# Patient Record
Sex: MALE | Race: WHITE | NOT HISPANIC OR LATINO | Employment: OTHER | URBAN - METROPOLITAN AREA
[De-identification: names, ages, dates, MRNs, and addresses within clinical notes are randomized per-mention and may not be internally consistent; named-entity substitution may affect disease eponyms.]

---

## 2018-01-11 ENCOUNTER — OFFICE VISIT (OUTPATIENT)
Dept: MEDICAL GROUP | Facility: MEDICAL CENTER | Age: 43
End: 2018-01-11
Payer: COMMERCIAL

## 2018-01-11 VITALS
RESPIRATION RATE: 20 BRPM | TEMPERATURE: 98 F | OXYGEN SATURATION: 98 % | BODY MASS INDEX: 24.4 KG/M2 | WEIGHT: 184.08 LBS | HEIGHT: 73 IN | HEART RATE: 46 BPM | SYSTOLIC BLOOD PRESSURE: 130 MMHG | DIASTOLIC BLOOD PRESSURE: 70 MMHG

## 2018-01-11 DIAGNOSIS — Z13.1 SCREENING FOR DIABETES MELLITUS: ICD-10-CM

## 2018-01-11 DIAGNOSIS — Z00.00 HEALTHCARE MAINTENANCE: ICD-10-CM

## 2018-01-11 DIAGNOSIS — Z13.6 SCREENING FOR ISCHEMIC HEART DISEASE: ICD-10-CM

## 2018-01-11 PROCEDURE — 99386 PREV VISIT NEW AGE 40-64: CPT | Performed by: FAMILY MEDICINE

## 2018-01-11 ASSESSMENT — PATIENT HEALTH QUESTIONNAIRE - PHQ9: CLINICAL INTERPRETATION OF PHQ2 SCORE: 0

## 2018-01-11 NOTE — PROGRESS NOTES
"[facility]  Progress Note  New Patient    Subjective:   Sergio Castro is a 42 y.o. male here today for a wellness exam. This is a new patient to me. The patient comes in alone.     Healthcare maintenance  Lipids: ordered.  Fasting Glucose: ordered.   PHQ2: done 01/11/18 and normal.    Flu vaccine: patient declines.  Tdap: patient states this was done 6-7 years ago.      No current outpatient prescriptions on file prior to visit.     No current facility-administered medications on file prior to visit.        Past Medical History:   Diagnosis Date   • Heart murmur     Mitral Valve Prolapse       Allergies: Patient has no known allergies.    Surgical History:  has a past surgical history that includes shoulder arthroscopy (Right, 1990); other (1979); shoulder arthroscopy w/ rotator cuff repair (Left, 4/6/2016); shoulder decompression arthroscopic (Left, 4/6/2016); shoulder arthroscopy w/ bicipital tenodesis repair (Left, 4/6/2016); and hernia repair (1981).    Family History: family history includes No Known Problems in his father and mother.    Social History:  reports that he has never smoked. He has never used smokeless tobacco. He reports that he drinks alcohol. He reports that he does not use drugs.    ROS:   No CP, SOB, lightheadedness.       Objective:     Vitals:    01/11/18 1103   BP: 130/70   Pulse: (!) 46   Resp: 20   Temp: 36.7 °C (98 °F)   SpO2: 98%   Weight: 83.5 kg (184 lb 1.4 oz)   Height: 1.854 m (6' 1\")       Physical Exam:  Constitutional: Alert, no distress.  Skin: Warm, dry, good turgor, no rashes in visible areas.  Eye: Equal, conjunctiva clear, lids normal.  Respiratory: Unlabored respiratory effort, lungs clear to auscultation, no wheezes, no ronchi.  Cardiovascular: Normal S1, S2, no murmur, no edema.  Psych: Alert and oriented,  normal affect and mood.        Assessment and Plan:     1. Healthcare maintenance  - see HPI.   - discussed diet and exercise.     2. Screening for " ischemic heart disease  - LIPID PROFILE; Future    3. Screening for diabetes mellitus  - BASIC METABOLIC PANEL; Future        Followup: Return in about 1 year (around 1/11/2019), or if symptoms worsen or fail to improve, for Wellness Visit, Long.

## 2018-01-11 NOTE — ASSESSMENT & PLAN NOTE
Lipids: ordered.  Fasting Glucose: ordered.   PHQ2: done 01/11/18 and normal.    Flu vaccine: patient declines.  Tdap: patient states this was done 6-7 years ago.

## 2018-01-16 ENCOUNTER — HOSPITAL ENCOUNTER (OUTPATIENT)
Dept: LAB | Facility: MEDICAL CENTER | Age: 43
End: 2018-01-16
Attending: FAMILY MEDICINE
Payer: COMMERCIAL

## 2018-01-16 DIAGNOSIS — Z13.6 SCREENING FOR ISCHEMIC HEART DISEASE: ICD-10-CM

## 2018-01-16 DIAGNOSIS — Z13.1 SCREENING FOR DIABETES MELLITUS: ICD-10-CM

## 2018-01-16 LAB
ANION GAP SERPL CALC-SCNC: 6 MMOL/L (ref 0–11.9)
BUN SERPL-MCNC: 19 MG/DL (ref 8–22)
CALCIUM SERPL-MCNC: 9.5 MG/DL (ref 8.5–10.5)
CHLORIDE SERPL-SCNC: 108 MMOL/L (ref 96–112)
CHOLEST SERPL-MCNC: 159 MG/DL (ref 100–199)
CO2 SERPL-SCNC: 25 MMOL/L (ref 20–33)
CREAT SERPL-MCNC: 1.06 MG/DL (ref 0.5–1.4)
GLUCOSE SERPL-MCNC: 87 MG/DL (ref 65–99)
HDLC SERPL-MCNC: 58 MG/DL
LDLC SERPL CALC-MCNC: 87 MG/DL
POTASSIUM SERPL-SCNC: 4.5 MMOL/L (ref 3.6–5.5)
SODIUM SERPL-SCNC: 139 MMOL/L (ref 135–145)
TRIGL SERPL-MCNC: 69 MG/DL (ref 0–149)

## 2018-01-16 PROCEDURE — 36415 COLL VENOUS BLD VENIPUNCTURE: CPT

## 2018-01-16 PROCEDURE — 80048 BASIC METABOLIC PNL TOTAL CA: CPT

## 2018-01-16 PROCEDURE — 80061 LIPID PANEL: CPT

## 2018-01-17 ENCOUNTER — TELEPHONE (OUTPATIENT)
Dept: MEDICAL GROUP | Facility: MEDICAL CENTER | Age: 43
End: 2018-01-17

## 2018-01-18 NOTE — TELEPHONE ENCOUNTER
----- Message from Solitario Michael M.D. sent at 1/17/2018 11:26 AM PST -----  Please call and inform this patient of the following:  His labs are all normal.

## 2018-01-18 NOTE — TELEPHONE ENCOUNTER
Phone Number Called: [phone] (home)     Message: Left msg to return call.    Left Message for patient to call back: yes

## 2019-01-11 ENCOUNTER — OFFICE VISIT (OUTPATIENT)
Dept: MEDICAL GROUP | Facility: MEDICAL CENTER | Age: 44
End: 2019-01-11
Payer: COMMERCIAL

## 2019-01-11 VITALS
DIASTOLIC BLOOD PRESSURE: 74 MMHG | HEART RATE: 48 BPM | RESPIRATION RATE: 16 BRPM | HEIGHT: 73 IN | BODY MASS INDEX: 24.65 KG/M2 | SYSTOLIC BLOOD PRESSURE: 122 MMHG | WEIGHT: 186 LBS | TEMPERATURE: 97.2 F | OXYGEN SATURATION: 100 %

## 2019-01-11 DIAGNOSIS — Z00.00 HEALTHCARE MAINTENANCE: ICD-10-CM

## 2019-01-11 DIAGNOSIS — Z23 NEED FOR VACCINATION: ICD-10-CM

## 2019-01-11 DIAGNOSIS — R01.1 MURMUR: ICD-10-CM

## 2019-01-11 PROCEDURE — 99396 PREV VISIT EST AGE 40-64: CPT | Mod: 25 | Performed by: FAMILY MEDICINE

## 2019-01-11 PROCEDURE — 90471 IMMUNIZATION ADMIN: CPT | Performed by: FAMILY MEDICINE

## 2019-01-11 PROCEDURE — 90715 TDAP VACCINE 7 YRS/> IM: CPT | Performed by: FAMILY MEDICINE

## 2019-01-11 ASSESSMENT — PATIENT HEALTH QUESTIONNAIRE - PHQ9: CLINICAL INTERPRETATION OF PHQ2 SCORE: 0

## 2019-01-11 NOTE — PROGRESS NOTES
"[facility]  Progress Note  Established Patient    Subjective:   Sergio Castro is a 43 y.o. male here today for a wellness esxam. The patient is alone.     Healthcare maintenance  Lipids: ordered.  Fasting Glucose: ordered.     Flu vaccine: patient declines.  Tdap: given 01/11/19.     Murmur  Patient states that he has a history of a mitral valve prolapse.  He denies chest pain, shortness of breath, palpitations, dizziness, lightheadedness and syncope.      No current outpatient prescriptions on file prior to visit.     No current facility-administered medications on file prior to visit.        Past Medical History:   Diagnosis Date   • Heart murmur     Mitral Valve Prolapse       Allergies: Patient has no known allergies.    Surgical History:  has a past surgical history that includes shoulder arthroscopy (Right, 1990); other (1979); shoulder arthroscopy w/ rotator cuff repair (Left, 4/6/2016); shoulder decompression arthroscopic (Left, 4/6/2016); shoulder arthroscopy w/ bicipital tenodesis repair (Left, 4/6/2016); and hernia repair (1981).    Family History: family history includes No Known Problems in his father and mother.    Social History:  reports that he has never smoked. He has never used smokeless tobacco. He reports that he drinks alcohol. He reports that he does not use drugs.    ROS: see HPI.        Objective:     Vitals:    01/11/19 1142   BP: 122/74   BP Location: Left arm   Patient Position: Sitting   BP Cuff Size: Large adult   Pulse: (!) 48   Resp: 16   Temp: 36.2 °C (97.2 °F)   TempSrc: Temporal   SpO2: 100%   Weight: 84.4 kg (186 lb)   Height: 1.854 m (6' 1\")       Physical Exam:  General: alert in no apparent distress.   Cardio: mid-systolic click.   Resp: CTAB no w/r/r.         Assessment and Plan:     1. Healthcare maintenance  - see HPI.   - discussed diet and exercise, Mediterranean Diet handout given.   - Lipid Profile; Future  - BLOOD GLUCOSE; Future    2. Need for " vaccination  - TDAP VACCINE =>8YO IM    3. Murmur  - EC-ECHOCARDIOGRAM COMPLETE W/O CONT; Future        Followup: Return in about 18 months (around 7/11/2020), or if symptoms worsen or fail to improve, for Wellness Visit, Long.

## 2019-01-11 NOTE — ASSESSMENT & PLAN NOTE
Patient states that he has a history of a mitral valve prolapse.  He denies chest pain, shortness of breath, palpitations, dizziness, lightheadedness and syncope.

## 2019-01-11 NOTE — PATIENT INSTRUCTIONS
Results for DEVYN MONROY (MRN [id number]) as of 1/11/2019 11:54   Ref. Range 1/16/2018 07:11   Sodium Latest Ref Range: 135 - 145 mmol/L 139   Potassium Latest Ref Range: 3.6 - 5.5 mmol/L 4.5   Chloride Latest Ref Range: 96 - 112 mmol/L 108   Co2 Latest Ref Range: 20 - 33 mmol/L 25   Anion Gap Latest Ref Range: 0.0 - 11.9  6.0   Glucose Latest Ref Range: 65 - 99 mg/dL 87   Bun Latest Ref Range: 8 - 22 mg/dL 19   Creatinine Latest Ref Range: 0.50 - 1.40 mg/dL 1.06   GFR If  Latest Ref Range: >60 mL/min/1.73 m 2 >60   GFR If Non  Latest Ref Range: >60 mL/min/1.73 m 2 >60   Calcium Latest Ref Range: 8.5 - 10.5 mg/dL 9.5   Cholesterol,Tot Latest Ref Range: 100 - 199 mg/dL 159   Triglycerides Latest Ref Range: 0 - 149 mg/dL 69   HDL Latest Ref Range: >=40 mg/dL 58   LDL Latest Ref Range: <100 mg/dL 87

## 2019-01-11 NOTE — ASSESSMENT & PLAN NOTE
Lipids: ordered.  Fasting Glucose: ordered.     Flu vaccine: patient declines.  Tdap: given 01/11/19.

## 2019-09-12 ENCOUNTER — OFFICE VISIT (OUTPATIENT)
Dept: URGENT CARE | Facility: CLINIC | Age: 44
End: 2019-09-12
Payer: COMMERCIAL

## 2019-09-12 ENCOUNTER — TELEPHONE (OUTPATIENT)
Dept: URGENT CARE | Facility: CLINIC | Age: 44
End: 2019-09-12

## 2019-09-12 ENCOUNTER — APPOINTMENT (OUTPATIENT)
Dept: MEDICAL GROUP | Facility: MEDICAL CENTER | Age: 44
End: 2019-09-12
Payer: COMMERCIAL

## 2019-09-12 ENCOUNTER — HOSPITAL ENCOUNTER (INPATIENT)
Facility: MEDICAL CENTER | Age: 44
LOS: 2 days | DRG: 868 | End: 2019-09-14
Attending: EMERGENCY MEDICINE | Admitting: HOSPITALIST
Payer: COMMERCIAL

## 2019-09-12 ENCOUNTER — HOSPITAL ENCOUNTER (OUTPATIENT)
Dept: LAB | Facility: MEDICAL CENTER | Age: 44
End: 2019-09-12
Attending: NURSE PRACTITIONER
Payer: COMMERCIAL

## 2019-09-12 VITALS
SYSTOLIC BLOOD PRESSURE: 98 MMHG | DIASTOLIC BLOOD PRESSURE: 68 MMHG | HEART RATE: 50 BPM | WEIGHT: 185 LBS | HEIGHT: 74 IN | OXYGEN SATURATION: 99 % | BODY MASS INDEX: 23.74 KG/M2 | TEMPERATURE: 98.1 F | RESPIRATION RATE: 16 BRPM

## 2019-09-12 DIAGNOSIS — D69.6 THROMBOCYTOPENIA (HCC): ICD-10-CM

## 2019-09-12 DIAGNOSIS — R21 RASH: ICD-10-CM

## 2019-09-12 DIAGNOSIS — R01.1 MURMUR: ICD-10-CM

## 2019-09-12 DIAGNOSIS — D61.818 PANCYTOPENIA, ACQUIRED (HCC): ICD-10-CM

## 2019-09-12 LAB
ABO + RH BLD: NORMAL
ABO GROUP BLD: NORMAL
ALBUMIN SERPL BCP-MCNC: 3.7 G/DL (ref 3.2–4.9)
ALBUMIN SERPL BCP-MCNC: 3.8 G/DL (ref 3.2–4.9)
ALBUMIN/GLOB SERPL: 1.1 G/DL
ALBUMIN/GLOB SERPL: 1.1 G/DL
ALP SERPL-CCNC: 41 U/L (ref 30–99)
ALP SERPL-CCNC: 49 U/L (ref 30–99)
ALT SERPL-CCNC: 74 U/L (ref 2–50)
ALT SERPL-CCNC: 85 U/L (ref 2–50)
ANION GAP SERPL CALC-SCNC: 13 MMOL/L (ref 0–11.9)
ANION GAP SERPL CALC-SCNC: 9 MMOL/L (ref 0–11.9)
APTT PPP: 26.6 SEC (ref 24.7–36)
AST SERPL-CCNC: 113 U/L (ref 12–45)
AST SERPL-CCNC: 145 U/L (ref 12–45)
BASOPHILS # BLD AUTO: 0 % (ref 0–1.8)
BASOPHILS # BLD AUTO: 1 % (ref 0–1.8)
BASOPHILS # BLD: 0 K/UL (ref 0–0.12)
BASOPHILS # BLD: 0.03 K/UL (ref 0–0.12)
BILIRUB SERPL-MCNC: 1.1 MG/DL (ref 0.1–1.5)
BILIRUB SERPL-MCNC: 1.4 MG/DL (ref 0.1–1.5)
BLD GP AB SCN SERPL QL: NORMAL
BUN SERPL-MCNC: 11 MG/DL (ref 8–22)
BUN SERPL-MCNC: 17 MG/DL (ref 8–22)
CALCIUM SERPL-MCNC: 8.8 MG/DL (ref 8.4–10.2)
CALCIUM SERPL-MCNC: 8.8 MG/DL (ref 8.5–10.5)
CHLORIDE SERPL-SCNC: 102 MMOL/L (ref 96–112)
CHLORIDE SERPL-SCNC: 108 MMOL/L (ref 96–112)
CO2 SERPL-SCNC: 20 MMOL/L (ref 20–33)
CO2 SERPL-SCNC: 23 MMOL/L (ref 20–33)
CREAT SERPL-MCNC: 0.87 MG/DL (ref 0.5–1.4)
CREAT SERPL-MCNC: 1 MG/DL (ref 0.5–1.4)
CRP SERPL HS-MCNC: 1.18 MG/DL (ref 0–0.75)
EOSINOPHIL # BLD AUTO: 0 K/UL (ref 0–0.51)
EOSINOPHIL # BLD AUTO: 0 K/UL (ref 0–0.51)
EOSINOPHIL NFR BLD: 0 % (ref 0–6.9)
EOSINOPHIL NFR BLD: 0 % (ref 0–6.9)
ERYTHROCYTE [DISTWIDTH] IN BLOOD BY AUTOMATED COUNT: 39.9 FL (ref 35.9–50)
ERYTHROCYTE [DISTWIDTH] IN BLOOD BY AUTOMATED COUNT: 40.5 FL (ref 35.9–50)
ERYTHROCYTE [SEDIMENTATION RATE] IN BLOOD BY WESTERGREN METHOD: 18 MM/HOUR (ref 0–15)
FIBRINOGEN PPP-MCNC: 387 MG/DL (ref 215–460)
GLOBULIN SER CALC-MCNC: 3.4 G/DL (ref 1.9–3.5)
GLOBULIN SER CALC-MCNC: 3.6 G/DL (ref 1.9–3.5)
GLUCOSE SERPL-MCNC: 140 MG/DL (ref 65–99)
GLUCOSE SERPL-MCNC: 98 MG/DL (ref 65–99)
HAV IGM SERPL QL IA: NEGATIVE
HBV CORE IGM SER QL: NEGATIVE
HBV SURFACE AG SER QL: NEGATIVE
HCT VFR BLD AUTO: 39.2 % (ref 42–52)
HCT VFR BLD AUTO: 43 % (ref 42–52)
HCV AB SER QL: NEGATIVE
HGB BLD-MCNC: 13.6 G/DL (ref 14–18)
HGB BLD-MCNC: 14.7 G/DL (ref 14–18)
HIV 1+2 AB+HIV1 P24 AG SERPL QL IA: NON REACTIVE
INR PPP: 0.96 (ref 0.87–1.13)
LACTATE BLD-SCNC: 1.2 MMOL/L (ref 0.5–2)
LDH SERPL L TO P-CCNC: 439 U/L (ref 107–266)
LYMPHOCYTES # BLD AUTO: 0.93 K/UL (ref 1–4.8)
LYMPHOCYTES # BLD AUTO: 2.53 K/UL (ref 1–4.8)
LYMPHOCYTES NFR BLD: 30 % (ref 22–41)
LYMPHOCYTES NFR BLD: 55 % (ref 22–41)
MANUAL DIFF BLD: NORMAL
MANUAL DIFF BLD: NORMAL
MCH RBC QN AUTO: 29.4 PG (ref 27–33)
MCH RBC QN AUTO: 30.3 PG (ref 27–33)
MCHC RBC AUTO-ENTMCNC: 34.2 G/DL (ref 33.7–35.3)
MCHC RBC AUTO-ENTMCNC: 34.7 G/DL (ref 33.7–35.3)
MCV RBC AUTO: 86 FL (ref 81.4–97.8)
MCV RBC AUTO: 87.3 FL (ref 81.4–97.8)
METAMYELOCYTES NFR BLD MANUAL: 1 %
MONOCYTES # BLD AUTO: 0.18 K/UL (ref 0–0.85)
MONOCYTES # BLD AUTO: 0.19 K/UL (ref 0–0.85)
MONOCYTES NFR BLD AUTO: 4 % (ref 0–13.4)
MONOCYTES NFR BLD AUTO: 6 % (ref 0–13.4)
MORPHOLOGY BLD-IMP: NORMAL
MYELOCYTES NFR BLD MANUAL: 2 %
NEUTROPHILS # BLD AUTO: 1.86 K/UL (ref 1.82–7.42)
NEUTROPHILS # BLD AUTO: 1.89 K/UL (ref 1.82–7.42)
NEUTROPHILS NFR BLD: 38 % (ref 44–72)
NEUTROPHILS NFR BLD: 55 % (ref 44–72)
NEUTS BAND NFR BLD MANUAL: 3 % (ref 0–10)
NEUTS BAND NFR BLD MANUAL: 5 % (ref 0–10)
NRBC # BLD AUTO: 0 K/UL
NRBC # BLD AUTO: 0 K/UL
NRBC BLD-RTO: 0 /100 WBC
NRBC BLD-RTO: 0 /100 WBC
PLATELET # BLD AUTO: 43 K/UL (ref 164–446)
PLATELET # BLD AUTO: 48 K/UL (ref 164–446)
PLATELET BLD QL SMEAR: NORMAL
PLATELET BLD QL SMEAR: NORMAL
PLATELETS.RETICULATED NFR BLD AUTO: 12 K/UL (ref 0.6–13.1)
PLATELETS.RETICULATED NFR BLD AUTO: 9.1 K/UL (ref 0.6–13.1)
PMV BLD AUTO: 10.9 FL (ref 9–12.9)
PMV BLD AUTO: 12.5 FL (ref 9–12.9)
POTASSIUM SERPL-SCNC: 3.8 MMOL/L (ref 3.6–5.5)
POTASSIUM SERPL-SCNC: 3.9 MMOL/L (ref 3.6–5.5)
PROCALCITONIN SERPL-MCNC: 0.17 NG/ML
PROT SERPL-MCNC: 7.1 G/DL (ref 6–8.2)
PROT SERPL-MCNC: 7.4 G/DL (ref 6–8.2)
PROTHROMBIN TIME: 13 SEC (ref 12–14.6)
RBC # BLD AUTO: 4.49 M/UL (ref 4.7–6.1)
RBC # BLD AUTO: 5 M/UL (ref 4.7–6.1)
RBC BLD AUTO: NORMAL
RBC BLD AUTO: NORMAL
RH BLD: NORMAL
SMUDGE CELLS BLD QL SMEAR: NORMAL
SODIUM SERPL-SCNC: 137 MMOL/L (ref 135–145)
SODIUM SERPL-SCNC: 138 MMOL/L (ref 135–145)
VARIANT LYMPHS BLD QL SMEAR: NORMAL
WBC # BLD AUTO: 3.1 K/UL (ref 4.8–10.8)
WBC # BLD AUTO: 4.6 K/UL (ref 4.8–10.8)

## 2019-09-12 PROCEDURE — 85007 BL SMEAR W/DIFF WBC COUNT: CPT

## 2019-09-12 PROCEDURE — 85027 COMPLETE CBC AUTOMATED: CPT

## 2019-09-12 PROCEDURE — 83615 LACTATE (LD) (LDH) ENZYME: CPT

## 2019-09-12 PROCEDURE — 86901 BLOOD TYPING SEROLOGIC RH(D): CPT

## 2019-09-12 PROCEDURE — 86038 ANTINUCLEAR ANTIBODIES: CPT

## 2019-09-12 PROCEDURE — 86850 RBC ANTIBODY SCREEN: CPT

## 2019-09-12 PROCEDURE — 85730 THROMBOPLASTIN TIME PARTIAL: CPT

## 2019-09-12 PROCEDURE — 85027 COMPLETE CBC AUTOMATED: CPT | Mod: 91

## 2019-09-12 PROCEDURE — 87040 BLOOD CULTURE FOR BACTERIA: CPT

## 2019-09-12 PROCEDURE — 85610 PROTHROMBIN TIME: CPT

## 2019-09-12 PROCEDURE — 85055 RETICULATED PLATELET ASSAY: CPT | Mod: 91

## 2019-09-12 PROCEDURE — 770006 HCHG ROOM/CARE - MED/SURG/GYN SEMI*

## 2019-09-12 PROCEDURE — 99285 EMERGENCY DEPT VISIT HI MDM: CPT

## 2019-09-12 PROCEDURE — 36415 COLL VENOUS BLD VENIPUNCTURE: CPT

## 2019-09-12 PROCEDURE — 80074 ACUTE HEPATITIS PANEL: CPT

## 2019-09-12 PROCEDURE — 80053 COMPREHEN METABOLIC PANEL: CPT

## 2019-09-12 PROCEDURE — 86140 C-REACTIVE PROTEIN: CPT

## 2019-09-12 PROCEDURE — 85055 RETICULATED PLATELET ASSAY: CPT

## 2019-09-12 PROCEDURE — 87389 HIV-1 AG W/HIV-1&-2 AB AG IA: CPT

## 2019-09-12 PROCEDURE — 99215 OFFICE O/P EST HI 40 MIN: CPT | Performed by: NURSE PRACTITIONER

## 2019-09-12 PROCEDURE — 85652 RBC SED RATE AUTOMATED: CPT

## 2019-09-12 PROCEDURE — 84145 PROCALCITONIN (PCT): CPT

## 2019-09-12 PROCEDURE — 85384 FIBRINOGEN ACTIVITY: CPT

## 2019-09-12 PROCEDURE — 86900 BLOOD TYPING SEROLOGIC ABO: CPT

## 2019-09-12 PROCEDURE — 85007 BL SMEAR W/DIFF WBC COUNT: CPT | Mod: 91

## 2019-09-12 PROCEDURE — 83605 ASSAY OF LACTIC ACID: CPT

## 2019-09-12 PROCEDURE — 80053 COMPREHEN METABOLIC PANEL: CPT | Mod: 91

## 2019-09-12 PROCEDURE — 99223 1ST HOSP IP/OBS HIGH 75: CPT | Mod: GC | Performed by: HOSPITALIST

## 2019-09-12 RX ORDER — PREDNISONE 20 MG/1
20-40 TABLET ORAL SEE ADMIN INSTRUCTIONS
Status: ON HOLD | COMMUNITY
Start: 2019-09-12 | End: 2019-09-13

## 2019-09-12 RX ORDER — POLYETHYLENE GLYCOL 3350 17 G/17G
1 POWDER, FOR SOLUTION ORAL
Status: DISCONTINUED | OUTPATIENT
Start: 2019-09-12 | End: 2019-09-14 | Stop reason: HOSPADM

## 2019-09-12 RX ORDER — DOXYCYCLINE HYCLATE 100 MG
100 TABLET ORAL 2 TIMES DAILY
Status: ON HOLD | COMMUNITY
Start: 2019-09-12 | End: 2019-09-14

## 2019-09-12 RX ORDER — AMOXICILLIN 250 MG
2 CAPSULE ORAL 2 TIMES DAILY
Status: DISCONTINUED | OUTPATIENT
Start: 2019-09-12 | End: 2019-09-14 | Stop reason: HOSPADM

## 2019-09-12 RX ORDER — DIPHENHYDRAMINE HCL 25 MG
25 TABLET ORAL EVERY 6 HOURS PRN
COMMUNITY
End: 2019-09-12

## 2019-09-12 RX ORDER — BISACODYL 10 MG
10 SUPPOSITORY, RECTAL RECTAL
Status: DISCONTINUED | OUTPATIENT
Start: 2019-09-12 | End: 2019-09-14 | Stop reason: HOSPADM

## 2019-09-12 RX ORDER — ACETAMINOPHEN 325 MG/1
650 TABLET ORAL EVERY 6 HOURS PRN
Status: DISCONTINUED | OUTPATIENT
Start: 2019-09-12 | End: 2019-09-14 | Stop reason: HOSPADM

## 2019-09-12 RX ORDER — DOXYCYCLINE HYCLATE 100 MG
100 TABLET ORAL 2 TIMES DAILY
Qty: 20 TAB | Refills: 0 | Status: SHIPPED | OUTPATIENT
Start: 2019-09-12 | End: 2019-09-12

## 2019-09-12 RX ORDER — DIPHENHYDRAMINE HCL 25 MG
25-50 TABLET ORAL
Status: ON HOLD | COMMUNITY
End: 2019-09-14

## 2019-09-12 RX ORDER — PREDNISONE 20 MG/1
TABLET ORAL
Qty: 14 TAB | Refills: 0 | Status: SHIPPED | OUTPATIENT
Start: 2019-09-12 | End: 2019-09-12

## 2019-09-12 SDOH — HEALTH STABILITY: MENTAL HEALTH: HOW OFTEN DO YOU HAVE A DRINK CONTAINING ALCOHOL?: 2-4 TIMES A MONTH

## 2019-09-12 ASSESSMENT — ENCOUNTER SYMPTOMS
ABDOMINAL PAIN: 0
MUSCULOSKELETAL NEGATIVE: 1
DIARRHEA: 0
MYALGIAS: 0
NAUSEA: 0
DOUBLE VISION: 0
COUGH: 0
HEADACHES: 0
CHILLS: 0
HEADACHES: 0
ABDOMINAL PAIN: 0
BLURRED VISION: 0
TINGLING: 0
CLAUDICATION: 0
VOMITING: 0
SENSORY CHANGE: 0
STRIDOR: 0
HEMOPTYSIS: 0
BLURRED VISION: 0
CONSTIPATION: 0
DIZZINESS: 0
DEPRESSION: 0
NECK PAIN: 0
SPEECH CHANGE: 0
BACK PAIN: 0
HEARTBURN: 0
DIARRHEA: 0
FEVER: 0
ROS GI COMMENTS: DIARRHEA RESOLVED
WHEEZING: 0
DOUBLE VISION: 0
ORTHOPNEA: 0
PALPITATIONS: 0
CHILLS: 0
WEIGHT LOSS: 0
COUGH: 0
TREMORS: 0
VOMITING: 0
NAUSEA: 0
HALLUCINATIONS: 0
PHOTOPHOBIA: 0
PALPITATIONS: 0
FEVER: 0
SORE THROAT: 0
SPUTUM PRODUCTION: 0
DIZZINESS: 0

## 2019-09-12 ASSESSMENT — LIFESTYLE VARIABLES
SUBSTANCE_ABUSE: 0
DO YOU DRINK ALCOHOL: NO

## 2019-09-12 NOTE — PROGRESS NOTES
"Subjective:   Sergio Castro is a 44 y.o. male who presents for Rash (lower extremities, pt notes that he recently returned from hunting, rash started on feet)        HPI   Patient with new onset rash to the bilateral lower extremities (feet) that started several days ago and has been spreading to his legs and arms. Denies itching or pain to the area. States he recently went hunting in [place] and the rash has worsened since his return. Denies alleviating or aggravating factors.  Denies hx of arthritis or bleeding disorders.  Denies recent insect or tick bite.    States earlier this week he felt like he had the flu, but symptoms have resolved.  Denies recent changes to medications - does not currently take any medications or has started new medications. States he took Benadryl, thinking it would help the rash, but without relief. Denies current fever, cough or cold symptoms.    Review of Systems   Constitutional: Negative for chills and fever.   HENT: Negative for congestion, ear discharge, ear pain and sore throat.    Eyes: Negative for blurred vision and double vision.   Respiratory: Negative for cough, wheezing and stridor.    Cardiovascular: Negative for chest pain and palpitations.   Gastrointestinal: Negative for abdominal pain, constipation, diarrhea, nausea and vomiting.   Musculoskeletal: Negative.    Skin: Positive for rash. Negative for itching.   Neurological: Negative for dizziness and headaches.   All other systems reviewed and are negative.    Patient's PMH, SocHx, SurgHx, FamHx, Drug allergies and medications reviewed.     Objective:   BP (!) 98/68 (BP Location: Right arm, Patient Position: Sitting, BP Cuff Size: Adult)   Pulse (!) 50   Temp 36.7 °C (98.1 °F) (Temporal)   Resp 16   Ht 1.88 m (6' 2\")   Wt 83.9 kg (185 lb)   SpO2 99%   BMI 23.75 kg/m²   Physical Exam   Constitutional: He is oriented to person, place, and time. He appears well-developed and well-nourished. No distress.   HENT: "   Head: Normocephalic.   Right Ear: Hearing, tympanic membrane and ear canal normal. Tympanic membrane is not erythematous. No middle ear effusion.   Left Ear: Hearing, tympanic membrane and ear canal normal. Tympanic membrane is not erythematous.  No middle ear effusion.   Nose: Nose normal. No rhinorrhea. Right sinus exhibits no maxillary sinus tenderness and no frontal sinus tenderness. Left sinus exhibits no maxillary sinus tenderness and no frontal sinus tenderness.   Mouth/Throat: Uvula is midline and mucous membranes are normal. No oral lesions. No uvula swelling. Posterior oropharyngeal erythema present. No oropharyngeal exudate, posterior oropharyngeal edema or tonsillar abscesses. Tonsils are 0 on the right. Tonsils are 0 on the left. No tonsillar exudate.   Eyes: Pupils are equal, round, and reactive to light. Conjunctivae, EOM and lids are normal.   Neck: Normal range of motion. No thyromegaly present.   Cardiovascular: Normal rate, regular rhythm and normal heart sounds.   Pulses:       Dorsalis pedis pulses are 2+ on the right side, and 2+ on the left side.        Posterior tibial pulses are 2+ on the right side, and 2+ on the left side.   Pulmonary/Chest: Effort normal and breath sounds normal. No respiratory distress. He has no wheezes.   Lymphadenopathy:        Head (right side): No submandibular and no tonsillar adenopathy present.        Head (left side): No submandibular and no tonsillar adenopathy present.   Neurological: He is alert and oriented to person, place, and time.   Skin: Skin is warm and dry. Purpura and rash noted. Rash is not pustular and not vesicular. He is not diaphoretic. There is erythema.        Erythematous macular rash to the bilateral lower extremities and upper arms/back. No vesicles or pustules noted.   Worsened on bilateral feet with mild purplish tint (appears to be right at the sock line with worsening)     Psychiatric: He has a normal mood and affect. His speech is  normal and behavior is normal. Judgment and thought content normal.   Vitals reviewed.        Assessment/Plan:   Assessment    1. Rash  - predniSONE (DELTASONE) 20 MG Tab; Take 2 tablets twice daily for 2 days, 2 tablets once daily for 2 days and then 1 tablet daily for 2 days.  Dispense: 14 Tab; Refill: 0  - doxycycline (VIBRAMYCIN) 100 MG Tab; Take 1 Tab by mouth 2 times a day for 10 days.  Dispense: 20 Tab; Refill: 0  - CBC WITH DIFFERENTIAL; Future  - Comp Metabolic Panel; Future  - ARON  - WESTERGREN SED RATE; Future    2. Thrombocytopenia (HCC)  - REFERRAL TO HEMATOLOGY ONCOLOGY Referral to? [facility]    Will treat for vasculitis, purpura versus tick bite ([place] Spotted Fever) since with recent hunting in [place]. Ordered blood work to check for signs of infection, inflammatory markers, etc.    ESR slightly elevated and platelet count low. Concerns for TTP and discussed with patient's PCP Dr Michael and he agrees to send patient to ER for further evaluation.  Patient informed to go to ER and I called ER Main and spoke to ER charge, who is aware patient is arriving.    Differential diagnosis, natural history, supportive care, and indications for immediate follow-up discussed.     **Please note that all invasive procedures during this visit were performed by myself and/or the Medical Assistant under the supervision of the PA or MD in office**

## 2019-09-12 NOTE — ED TRIAGE NOTES
"Chief Complaint   Patient presents with   • Sent from Urgent Care   • Rash     diffuse red rash x2 days (worse to BLE). went hunting in [place] and came back with this rash.    • Abnormal Labs     Platelet Count low, elevated LFTs.      Pt to triage for above. Mask provided. Pt in [place] until rm available.   NAD noted, denies PMH.    /77   Pulse 60   Temp 36.2 °C (97.2 °F) (Temporal)   Resp 16   Ht 1.88 m (6' 2\")   Wt 82.5 kg (181 lb 14.1 oz)   SpO2 99%   BMI 23.35 kg/m²     "

## 2019-09-12 NOTE — TELEPHONE ENCOUNTER
Lab called to notify of PLTS=43.  Component      Latest Ref Rng & Units 9/12/2019          12:02 PM   WBC      4.8 - 10.8 K/uL 4.6 (L)   RBC      4.70 - 6.10 M/uL 5.00   Hemoglobin      14.0 - 18.0 g/dL 14.7   Hematocrit      42.0 - 52.0 % 43.0   MCV      81.4 - 97.8 fL 86.0   MCH      27.0 - 33.0 pg 29.4   MCHC      33.7 - 35.3 g/dL 34.2   RDW      35.9 - 50.0 fL 39.9   Platelet Count      164 - 446 K/uL 43 (LL)   MPV      9.0 - 12.9 fL 10.9   Neutrophils-Polys      44.00 - 72.00 % 38.00 (L)   Lymphocytes      22.00 - 41.00 % 55.00 (H)   Monocytes      0.00 - 13.40 % 4.00   Eosinophils      0.00 - 6.90 % 0.00   Basophils      0.00 - 1.80 % 0.00   Nucleated RBC      /100 WBC 0.00   Neutrophils (Absolute)      1.82 - 7.42 K/uL 1.89   Lymphs (Absolute)      1.00 - 4.80 K/uL 2.53   Monos (Absolute)      0.00 - 0.85 K/uL 0.18   Eos (Absolute)      0.00 - 0.51 K/uL 0.00   Baso (Absolute)      0.00 - 0.12 K/uL 0.00   NRBC (Absolute)      K/uL 0.00     Please advise.

## 2019-09-13 ENCOUNTER — APPOINTMENT (OUTPATIENT)
Dept: RADIOLOGY | Facility: MEDICAL CENTER | Age: 44
DRG: 868 | End: 2019-09-13
Attending: STUDENT IN AN ORGANIZED HEALTH CARE EDUCATION/TRAINING PROGRAM
Payer: COMMERCIAL

## 2019-09-13 PROBLEM — D69.3: Status: ACTIVE | Noted: 2019-09-13

## 2019-09-13 PROBLEM — D61.818 PANCYTOPENIA, ACQUIRED (HCC): Status: ACTIVE | Noted: 2019-09-13

## 2019-09-13 PROBLEM — R23.3 PETECHIAE: Status: ACTIVE | Noted: 2019-09-13

## 2019-09-13 PROBLEM — D69.6 THROMBOCYTOPENIA (HCC): Status: ACTIVE | Noted: 2019-09-13

## 2019-09-13 LAB
ALBUMIN SERPL BCP-MCNC: 3.5 G/DL (ref 3.2–4.9)
ALBUMIN/GLOB SERPL: 1 G/DL
ALP SERPL-CCNC: 37 U/L (ref 30–99)
ALT SERPL-CCNC: 65 U/L (ref 2–50)
AMORPH CRY #/AREA URNS HPF: PRESENT /HPF
ANION GAP SERPL CALC-SCNC: 8 MMOL/L (ref 0–11.9)
APPEARANCE UR: ABNORMAL
AST SERPL-CCNC: 90 U/L (ref 12–45)
BACTERIA #/AREA URNS HPF: NEGATIVE /HPF
BASOPHILS # BLD AUTO: 0.9 % (ref 0–1.8)
BASOPHILS # BLD: 0.04 K/UL (ref 0–0.12)
BILIRUB SERPL-MCNC: 1.1 MG/DL (ref 0.1–1.5)
BILIRUB UR QL STRIP.AUTO: NEGATIVE
BUN SERPL-MCNC: 15 MG/DL (ref 8–22)
CALCIUM SERPL-MCNC: 8.9 MG/DL (ref 8.5–10.5)
CHLORIDE SERPL-SCNC: 108 MMOL/L (ref 96–112)
CO2 SERPL-SCNC: 22 MMOL/L (ref 20–33)
COLOR UR: YELLOW
CREAT SERPL-MCNC: 0.86 MG/DL (ref 0.5–1.4)
EKG IMPRESSION: NORMAL
EOSINOPHIL # BLD AUTO: 0 K/UL (ref 0–0.51)
EOSINOPHIL NFR BLD: 0 % (ref 0–6.9)
EPI CELLS #/AREA URNS HPF: ABNORMAL /HPF
ERYTHROCYTE [DISTWIDTH] IN BLOOD BY AUTOMATED COUNT: 40.7 FL (ref 35.9–50)
GLOBULIN SER CALC-MCNC: 3.4 G/DL (ref 1.9–3.5)
GLUCOSE SERPL-MCNC: 117 MG/DL (ref 65–99)
GLUCOSE UR STRIP.AUTO-MCNC: NEGATIVE MG/DL
H PYLORI AG STL QL IA: NOT DETECTED
HCT VFR BLD AUTO: 38.7 % (ref 42–52)
HGB BLD-MCNC: 13.3 G/DL (ref 14–18)
KETONES UR STRIP.AUTO-MCNC: NEGATIVE MG/DL
LEUKOCYTE ESTERASE UR QL STRIP.AUTO: NEGATIVE
LYMPHOCYTES # BLD AUTO: 1.52 K/UL (ref 1–4.8)
LYMPHOCYTES NFR BLD: 38.9 % (ref 22–41)
MANUAL DIFF BLD: NORMAL
MCH RBC QN AUTO: 29.9 PG (ref 27–33)
MCHC RBC AUTO-ENTMCNC: 34.4 G/DL (ref 33.7–35.3)
MCV RBC AUTO: 87 FL (ref 81.4–97.8)
METAMYELOCYTES NFR BLD MANUAL: 2.7 %
MICRO URNS: ABNORMAL
MONOCYTES # BLD AUTO: 0.17 K/UL (ref 0–0.85)
MONOCYTES NFR BLD AUTO: 4.4 % (ref 0–13.4)
MORPHOLOGY BLD-IMP: NORMAL
MYELOCYTES NFR BLD MANUAL: 1.8 %
NEUTROPHILS # BLD AUTO: 2 K/UL (ref 1.82–7.42)
NEUTROPHILS NFR BLD: 48.7 % (ref 44–72)
NEUTS BAND NFR BLD MANUAL: 2.6 % (ref 0–10)
NITRITE UR QL STRIP.AUTO: NEGATIVE
NRBC # BLD AUTO: 0 K/UL
NRBC BLD-RTO: 0 /100 WBC
PH UR STRIP.AUTO: 7 [PH] (ref 5–8)
PLATELET # BLD AUTO: 51 K/UL (ref 164–446)
PLATELET BLD QL SMEAR: NORMAL
PMV BLD AUTO: 11.8 FL (ref 9–12.9)
POIKILOCYTOSIS BLD QL SMEAR: NORMAL
POTASSIUM SERPL-SCNC: 4.1 MMOL/L (ref 3.6–5.5)
PROT SERPL-MCNC: 6.9 G/DL (ref 6–8.2)
PROT UR QL STRIP: NEGATIVE MG/DL
RBC # BLD AUTO: 4.45 M/UL (ref 4.7–6.1)
RBC # URNS HPF: ABNORMAL /HPF
RBC BLD AUTO: PRESENT
RBC UR QL AUTO: NEGATIVE
SODIUM SERPL-SCNC: 138 MMOL/L (ref 135–145)
SP GR UR STRIP.AUTO: 1.02
UROBILINOGEN UR STRIP.AUTO-MCNC: 2 MG/DL
WBC # BLD AUTO: 3.9 K/UL (ref 4.8–10.8)
WBC #/AREA URNS HPF: ABNORMAL /HPF
WBC STL QL MICRO: NORMAL

## 2019-09-13 PROCEDURE — 86638 Q FEVER ANTIBODY: CPT | Mod: 91

## 2019-09-13 PROCEDURE — 700102 HCHG RX REV CODE 250 W/ 637 OVERRIDE(OP): Performed by: STUDENT IN AN ORGANIZED HEALTH CARE EDUCATION/TRAINING PROGRAM

## 2019-09-13 PROCEDURE — 80053 COMPREHEN METABOLIC PANEL: CPT

## 2019-09-13 PROCEDURE — 93010 ELECTROCARDIOGRAM REPORT: CPT | Performed by: INTERNAL MEDICINE

## 2019-09-13 PROCEDURE — 76700 US EXAM ABDOM COMPLETE: CPT

## 2019-09-13 PROCEDURE — 36415 COLL VENOUS BLD VENIPUNCTURE: CPT

## 2019-09-13 PROCEDURE — 85007 BL SMEAR W/DIFF WBC COUNT: CPT

## 2019-09-13 PROCEDURE — 99233 SBSQ HOSP IP/OBS HIGH 50: CPT | Mod: GC | Performed by: HOSPITALIST

## 2019-09-13 PROCEDURE — 99358 PROLONG SERVICE W/O CONTACT: CPT | Mod: GC | Performed by: HOSPITALIST

## 2019-09-13 PROCEDURE — 85027 COMPLETE CBC AUTOMATED: CPT

## 2019-09-13 PROCEDURE — A9270 NON-COVERED ITEM OR SERVICE: HCPCS | Performed by: STUDENT IN AN ORGANIZED HEALTH CARE EDUCATION/TRAINING PROGRAM

## 2019-09-13 PROCEDURE — 93005 ELECTROCARDIOGRAM TRACING: CPT | Performed by: STUDENT IN AN ORGANIZED HEALTH CARE EDUCATION/TRAINING PROGRAM

## 2019-09-13 PROCEDURE — 87338 HPYLORI STOOL AG IA: CPT

## 2019-09-13 PROCEDURE — 87476 LYME DIS DNA AMP PROBE: CPT

## 2019-09-13 PROCEDURE — 770006 HCHG ROOM/CARE - MED/SURG/GYN SEMI*

## 2019-09-13 PROCEDURE — 86790 VIRUS ANTIBODY NOS: CPT

## 2019-09-13 PROCEDURE — 86757 RICKETTSIA ANTIBODY: CPT | Mod: 91

## 2019-09-13 PROCEDURE — 81001 URINALYSIS AUTO W/SCOPE: CPT

## 2019-09-13 PROCEDURE — 71046 X-RAY EXAM CHEST 2 VIEWS: CPT

## 2019-09-13 PROCEDURE — 89055 LEUKOCYTE ASSESSMENT FECAL: CPT

## 2019-09-13 RX ORDER — DOXYCYCLINE 100 MG/1
200 TABLET ORAL ONCE
Status: COMPLETED | OUTPATIENT
Start: 2019-09-13 | End: 2019-09-13

## 2019-09-13 RX ORDER — DOXYCYCLINE 100 MG/1
100 TABLET ORAL EVERY 12 HOURS
Status: DISCONTINUED | OUTPATIENT
Start: 2019-09-13 | End: 2019-09-14 | Stop reason: HOSPADM

## 2019-09-13 RX ADMIN — DOXYCYCLINE 100 MG: 100 TABLET, FILM COATED ORAL at 18:20

## 2019-09-13 RX ADMIN — DOXYCYCLINE 200 MG: 100 TABLET, FILM COATED ORAL at 12:55

## 2019-09-13 ASSESSMENT — PATIENT HEALTH QUESTIONNAIRE - PHQ9
2. FEELING DOWN, DEPRESSED, IRRITABLE, OR HOPELESS: NOT AT ALL
1. LITTLE INTEREST OR PLEASURE IN DOING THINGS: NOT AT ALL
SUM OF ALL RESPONSES TO PHQ9 QUESTIONS 1 AND 2: 0

## 2019-09-13 ASSESSMENT — COGNITIVE AND FUNCTIONAL STATUS - GENERAL
SUGGESTED CMS G CODE MODIFIER MOBILITY: CH
SUGGESTED CMS G CODE MODIFIER DAILY ACTIVITY: CH
MOBILITY SCORE: 24
DAILY ACTIVITIY SCORE: 24

## 2019-09-13 ASSESSMENT — LIFESTYLE VARIABLES
HAVE YOU EVER FELT YOU SHOULD CUT DOWN ON YOUR DRINKING: NO
EVER_SMOKED: NEVER
TOTAL SCORE: 0
HOW MANY TIMES IN THE PAST YEAR HAVE YOU HAD 5 OR MORE DRINKS IN A DAY: 0
ON A TYPICAL DAY WHEN YOU DRINK ALCOHOL HOW MANY DRINKS DO YOU HAVE: 0
CONSUMPTION TOTAL: NEGATIVE
EVER FELT BAD OR GUILTY ABOUT YOUR DRINKING: NO
ALCOHOL_USE: YES
AVERAGE NUMBER OF DAYS PER WEEK YOU HAVE A DRINK CONTAINING ALCOHOL: 1
TOTAL SCORE: 0
TOTAL SCORE: 0
EVER HAD A DRINK FIRST THING IN THE MORNING TO STEADY YOUR NERVES TO GET RID OF A HANGOVER: NO
HAVE PEOPLE ANNOYED YOU BY CRITICIZING YOUR DRINKING: NO

## 2019-09-13 NOTE — PROGRESS NOTES
Pt admitted from ED, report received from WAQAR Waldron. Pt on unit at approximately 0100. Pt AOx4, denies pain at this time, NPO for US abdomen in AM. Pt resting quietly in bed needs met. Call light within reach, personal belongings available, bed in lowest position, treaded socks on, and hourly rounding in place.

## 2019-09-13 NOTE — SENIOR ADMIT NOTE
Senior Admit Note     CC: worsening lower extremity rash     HPI: Mr. Castro is a pleasant very active 43 yo male with no PMHx who presents to the ED with 1 day of worsening petichial rash that started on his feet yesterday evening and has progressively grown to cover both legs, groin and lower abdomen as well as some on His chest, hands and forearms. It is not painful or pruritic. He recently returned from a camping/hunting trip in [place]. He states he arrived Thursday and on Friday become ill with a what he believed to be the flu, with symptoms of headache, nausea, body aches, non-bloody diarrhea and extreme fatigue, this lasted for approximately 3 days and then started to improve. The rash occurred following this illness. He denies any tick nor insect bite, denies drinking stream/lake water. approximately 2 weeks ago he was in [place] where he did get several mosquito bites but otherwise denies any symptoms following that trip.   He takes no medications and denies taking any new medication or herbal supplements. He denies any current headache, chest pain, palpitations, abdominal pain, nausea or vomiting, he still admits to some fatigue but feels better than he had the last couple of days. He denies any bleeding including epistaxis, bleeding gums, hematuria, melena or hematochezia.     He denies any known history of autoimmune disease in himself or his family, no family hx of bleeding or clotting disorders. He has 2 small children at home and neither of them have been recently ill.     In ED, Temp 97.2, HR 51-60, RR 16, /77, 99% on RA. Labs are remarkable for WBC 3.1, H/H 13.6/39.2, Plt 48, glu 140, AST//74, , LA 1.2, PT/INR 13/0.96, neg pro-nataliya, CRP 1.18. Hep panel and HIV are negative.     Physical Exam   Constitutional: He is oriented to person, place, and time. He appears well-developed and well-nourished. No distress.   Fit and healthy appearing male, appears stated age   HENT:   Head:  Normocephalic and atraumatic.   Mouth/Throat: No oropharyngeal exudate.   Moist mucous membranes, no bleeding gums, petechia present on roof of mouth. Bruising/sore on bottom lip, cheeks appear nohelia    Eyes: Pupils are equal, round, and reactive to light. Conjunctivae and EOM are normal. No scleral icterus.   Neck: Neck supple. No JVD present. No thyromegaly present.   Cardiovascular: Normal rate, regular rhythm, normal heart sounds and intact distal pulses. Exam reveals no friction rub.   No murmur heard.  Pulmonary/Chest: Effort normal and breath sounds normal. No respiratory distress. He has no wheezes. He has no rales.   Abdominal: Soft. Bowel sounds are normal. He exhibits no distension and no mass. There is no tenderness. There is no rebound and no guarding.   Musculoskeletal: Normal range of motion. He exhibits no edema or tenderness.   Lymphadenopathy:     He has no cervical adenopathy.   Neurological: He is alert and oriented to person, place, and time. No cranial nerve deficit. Coordination normal.   Skin: Skin is warm and dry. Rash noted. He is not diaphoretic. There is erythema.   Significant purpura over bilateral feet (both dorsal and plantar surfaces) with extension up to shins. Significant petechia present over thighs with extension to groin and up to the umbilical region. Also present over bilateral hands and wrist, some present on chest wall as well    Psychiatric: He has a normal mood and affect. Thought content normal.     Assessment and Plan:    45 yo male with no PMHx presents with an extensive petechial rash with thrombocytopenia following a suspected viral illness concerning for ITP vs less likely TTP/HUS give lack of neurological symptoms and renal involvement. Aplastic anemia and leukemia are also possible given decrease WBC/RBC mass as well. Coagulation panel normal making DIC highly unlikely.   If ITP, suspect secondary to infectious etiology given his recent vital-like syndrome.  Autoimmune causes less likely given lack of symptoms.     - admit to medicine   - Plt 43, given plt >20,000 without evidence of bleeding will hold on treatment at this time and monitor closely, if there is development of bleeding and/or further drop in plt will initiate steroid treatment   - hold DVT prophylaxis   - HIV/Hep C negative   - TSH, ARON, H. Pylori, EBV, Folate/B12, Karolina pending   - UA pending   - consider hematology consultation   - abdominal US pending to evaluate liver and spleen  - elevated liver enzymes, continue to monitor     Nadeen Carty MD

## 2019-09-13 NOTE — ED NOTES
Med Rec Updated and Complete per Pt at bedside  Allergies Reviewed    Pt is currently on a 10-day course of Doxycycline 100mg twice daily Starting 09/12/19 Due to End 09/21/19.  Pt is also on a Prednisone Taper Starting 09/12/19 Due to End 09/17/19.    Pt denies maintenance medication at this time.

## 2019-09-13 NOTE — ED NOTES
Report called to Beth ZAVALETA on admitting floor. Pt transported to floor. Chart and belongings with patient. No acute signs distress present.

## 2019-09-13 NOTE — ASSESSMENT & PLAN NOTE
WBC = 3.1  RBC= 4.4  Platelets = 19806    Plan:  pancytopenia after a flu like illness with low grade fever, myalgias and arthralgias along with diarrhea  after recent travel history ot [place] and camping in [place] increases the likely storm of mosquito or tick born zoonotic etiology but other differentials include  Automiimue with vasculitis vs Malignancy Vs Aplastic/bone marrow disorder   Plan:   - Serology and PCR to rule out Infectious causes /zoonotic etiology   - consulted Heme /oncho - suspecting more probability of infectious etiology as the patient's rash is improving along with improved in the blood cell counts - platelets and WBC

## 2019-09-13 NOTE — PROGRESS NOTES
"       Internal Medicine Interval Note  Note Author: Leti Mckeon M.D.     Name Sergio Castro     1975   Age/Sex 44 y.o. male   MRN [id number]   Code Status Full code     After 5PM or if no immediate response to page, please call for cross-coverage  Attending/Team: Dr Hernandez / staci  See Patient List for primary contact information  Call [phone] to page    1st Call - Day Intern (R1):   Dr Mckeon  2nd Call - Day Sr. Resident (R2/R3):   Dr Schaffer         Reason for interval visit  (Principal Problem)   Petechial Rash and fatigue       Interval Problem Daily Status Update  (24 hours, problem oriented, brief subjective history, new lab/imaging data pertinent to that problem)   A 43 y.o male patient with no known chronic medical conditions in the past came to the ED with the complaint of Fatigue and Rash all over the body but more intense over the bilateral legs  including the soles. The rash is not itching or painful and is progressive which started on - on the feet and later spread towards the knees.  He has a recent travel history he went to [facility] ([place]) 2 weeks back where he had multiple \"bug\" and \"mosquito\" bites. Then last week around  he went camping/hunting  in [place] where the next day he developed constitutional symptoms with fever, arthralgias, and diarrhea which lasted  for 3-4 days. He then developed small dot like multiple erythematous rashes over the feet -  macular rash  Which has now coalesced and is solid erythema to above the knees.   Yesterday the patient went to the urgent care where they gave him doxycycline and prednisone which he took 1 day doses and the blood test showed abnormal CBC so he was sent to the hospital .   In the labs the patient has  pancytopenic with severe thrombocytopenia =48 and has a very high LDH of 439 ,mild transaminitis with AST -245 and ALT 85.   DDx:   1. zooinotic infection such as Chikungunya/ Dengue/west lorenzo disease due to mosquito " bites versus a rickettsial disease or Hanta virus infection ( high LDH and  low platelets raise a concern for the  Hanta).  -Will consult ID .  2.  Autoimmune etiology : processes are possible but less likely 3. Less likely Malignancy but will consult  heme/onc.   -will consider bone marrow bx.     Mx : At present will start Emperical treatment with Doxycycline . Will consider Steroids treatment ( for autoimmune ) after consulting ID and Heme/oncho.  -Labs ordered - PCR and serology for suspected infectious causes .  - labs for ESR ,CRP ,ARON panel  - Tourniquet test performed but inconclusive .    ROS  Constitutional: Negative for chills, fever and weight loss.   HENT: Negative for ear discharge, ear pain, hearing loss and tinnitus.    Eyes: Negative for blurred vision, double vision and photophobia.   Respiratory: Negative for cough, hemoptysis and sputum production.    Cardiovascular: Negative for chest pain, palpitations, orthopnea and claudication.   Gastrointestinal: Negative for abdominal pain, diarrhea, heartburn, nausea and vomiting.        Diarrhea for 4-5 days which stopped a day before the rash started . It was semisolid consistency to watery , 5-6 times a day , non bloody in nature .  Genitourinary: Negative for dysuria and urgency.   Musculoskeletal: Joint pains and muscle pain present 1 week back before the diarrhea and rash started.   Skin: Positive for rash. - started 2 days back , progressive in nature , no itching or pain .  Neurological: Negative for dizziness, tingling, tremors, sensory change, speech change and headaches.   Psychiatric/Behavioral: Negative for depression, hallucinations, substance abuse and suicidal ideas    Disposition/Barriers to discharge:   Treatment of the Rash and further work up     Consultants/Specialty  ID consult - Dr Ze Aburto/Onc consult - Dr Sofia   PCP: Solitario Michael M.D.      Quality Measures  Quality-Core Measures   Reviewed items::  EKG reviewed,  Labs reviewed, Medications reviewed and Radiology images reviewed  Disla catheter::  No Disla  : Doxycycline 100 mg BID for 10 days ( started on 09/13/19           Physical Exam       Vitals:    09/13/19 0100 09/13/19 0302 09/13/19 0800 09/13/19 1500   BP: 131/87 133/79 136/84 138/83   Pulse: (!) 47 (!) 46 (!) 44 (!) 48   Resp: 16 16 16 17   Temp: 36.4 °C (97.5 °F) 36.3 °C (97.4 °F) 36.8 °C (98.3 °F) 36.7 °C (98.1 °F)   TempSrc: Temporal Temporal Temporal Temporal   SpO2: 98% 97% 97% 100%   Weight:       Height:         Body mass index is 23.35 kg/m².    Oxygen Therapy:  Pulse Oximetry: 100 %, O2 (LPM): 0, O2 Delivery: None (Room Air)    Physical Exam    Constitutional: He is oriented to person, place, and time and well-developed, well-nourished, and in no distress. He is athletic in built .  Head: Normocephalic and atraumatic.   Right Ear: External ear normal.   Left Ear: External ear normal.   Eyes: Pupils are equal, round, and reactive to light. Conjunctivae and EOM are normal.   Neck: Normal range of motion. Neck supple. No thyromegaly present.   Cardiovascular: Normal rate, regular rhythm and normal heart sounds.   Pulmonary/Chest: Effort normal and breath sounds normal. No respiratory distress. He has no wheezes.   Abdominal: Soft. Bowel sounds are normal. He exhibits no distension. There is no tenderness. There is no rebound.   Musculoskeletal: Normal range of motion. He exhibits no edema or deformity.   Neurological: He is alert and oriented to person, place, and time. Gait normal. GCS score is 15.   Skin: Skin is dry. Rash noted. There is erythema. Rash is petechial , non blanching ,non painful/tender  all over the body including oral mucosa. It is coalescing over the lower limbs - From feet to the knees involving sole of the foot.  Psychiatric: Mood, memory, affect and judgment normal.       Assessment/Plan     Petechial rash  Assessment & Plan  44 yr old with acute onset of Non blanchable purpuric rash  all over the body ,predominantly on B/L lower extremities .   Labs show low platelet count in 40,000s . Along with low WBC and RBC count .  - Increased LDH   - Mild elevated Transaminases.  - mild elevated CRP  - Less likely TTP as no associated microangiopathic hemolytic anemia or renal failure.  thrombocytopenia after a flu like illness with low grade fever, myalgias and arthralgias along with diarrhea  After recent travel history ot [place] and camping in [place] increases the likely storm of mosquito or tick born zoonotic etiology but other differentials include  Automiimue with vasculitis vs Malignancy vs ITP .  Plan:   - Serology and PCR to rule out Infectious causes /zoonotic etiology - Rule out West nile/chickungunya/lymes/Ricketssia/Hanta/Q fever.  - consult ID   -will do ARON with reflexology to rule out Autoimmune cause .  - consult Heme /oncho and will consider Bone marrow aspiration as a possible test if other tests are non diagnostic.  - Stool Wbcs since had diarrhea - to rule out infectious cause   - EKG as patient has bradycardia   - No platelet transfusion . Only supportive treatment for now as platelets are 47357 and no bleeding manifestations.  -Tourniquet test performed but inconclusive .  Start Empirical Doxycycline - 200 mg one time and then 100 mg BID for 10 days  - Consider steroids treatment after consulting ID and Heme-oncho.            Pancytopenia, acquired (HCC)  Assessment & Plan  WBC = 3.1  RBC= 4.4  Platelets = 47220    Plan:  pancytopenia after a flu like illness with low grade fever, myalgias and arthralgias along with diarrhea  after recent travel history ot [place] and camping in [place] increases the likely storm of mosquito or tick born zoonotic etiology but other differentials include  Automiimue with vasculitis vs Malignancy Vs Aplastic/bone marrow disorder   Plan:   - Serology and PCR to rule out Infectious causes /zoonotic etiology - Rule out [place]  nile/chickungunya/lymes/Ricketssia/Hanta/Q fever.  -will do ARON with reflexology to rule out Autoimmune cause .  - consult ID   - consult Heme /oncho and will consider Bone marrow aspiration as a possible test if other tests are non diagnostic.  - start steroids after ID and Heme/onch suggestion      Thrombocytopenia (HCC)  Assessment & Plan  Platelet count of [address] associated with petechiae and purpuric rash after diarrheal illness during a recent camping/hunting trip in [place].  No bleeding manifestations now.  See A & P for petechial rash

## 2019-09-13 NOTE — ED NOTES
Report received from [facility]. Pt resting comfortably in bed without needs. Call light within reach.

## 2019-09-13 NOTE — PROGRESS NOTES
2 RN skin check completed with WAQAR Ugarte.   Devices in place: PIV.  Skin assessed under devices: yes.  Confirmed pressure ulcers found: none.  New potential pressure ulcers noted: none. Wound consult placed:  n/a.    Skin assessment: heels pink/blanching. Elbows pink/blanching. Bilateral feet arches pink/red. Tops of feet up to shin are red/non-blanching. Petechiae redness bilateral BLE from shin to just above groin and below abdomen. Scattered pin-point rash BUE and back. Rest of skin intact, no open areas noted.    The following interventions in place: encouraged to reposition self frequently in bed. Pillows for positioning.

## 2019-09-13 NOTE — ED NOTES
Patient resting on gurney, respirations even and unlabored, no change in body rash, patient denies needs at this time.  Patient updated on POC, awaiting bed assignment, no questions.

## 2019-09-13 NOTE — ED NOTES
tech from Lab called with critical result of platelets at 2042. Critical lab result read back to tech.   Dr. Craven notified of critical lab result at 2042.  Critical lab result read back by Dr. Craven.

## 2019-09-13 NOTE — H&P
Internal Medicine Admitting History and Physical    Note Author: Kartik Omer M.D.       Name Sergio Castro     1975   Age/Sex 44 y.o. male   MRN [id number]   Code Status  full code     After 5PM or if no immediate response to page, please call for cross-coverage  Attending/Team: Dr. Hernandez/staci See Patient List for primary contact information  Call [phone] to page    1st Call - Day Intern (R1):   Dr. Mckeon 2nd Call - Day Sr. Resident (R2/R3):   Dr. Schaffer       Chief Complaint:   Purpuric rash on B/L lower limbs, fatigue    HPI:  Mr. Castro is a 43-year-old gentleman with no significant past medical history came with complaints of rash appeared to be purpuric in bilateral lower limbs extending from feet to shin.  He first noticed the symptoms yesterday and has worsened in the last 24 hours.  He went for camping trip last Thursday and developed diarrhea during the trip with 2 to 3 loose stools every day over friday, Saturday and  without blood or mucus in the stool. Diarrhea was associated with generalized bodyaches, fever and headache to pick a point where he felt very sick. After returning to [place], he noticed erythematous rash on his both lower limbs which worsened over last 24 hrs. Rash is not itchy, not associated with pain, no vesicles or bullae, no hyper or hypopigmentation. Petechiae present through out the lower limbs extending till umbilicus. Mild petechiae on upper body and some in the oral mucosa. His diarrhea has resolved now. He denied any c/o fever, chills, headache, vision changes, chest pain, palpitations, abdominal pain, diarrhea/constipation, dysuria or burning micturition.    ED course:  Labs: CBC- thrombocytopenia(platelet-77721), Hb-13.6, AST/ALT/ALP- 113/74/41. Elevated LDH- 439. PT/INR-wnl, CRP-1.18        Review of Systems   Constitutional: Negative for chills, fever and weight loss.   HENT: Negative for ear discharge, ear pain, hearing loss  and tinnitus.    Eyes: Negative for blurred vision, double vision and photophobia.   Respiratory: Negative for cough, hemoptysis and sputum production.    Cardiovascular: Negative for chest pain, palpitations, orthopnea and claudication.   Gastrointestinal: Negative for abdominal pain, diarrhea, heartburn, nausea and vomiting.        Diarrhea resolved   Genitourinary: Negative for dysuria and urgency.   Musculoskeletal: Negative for back pain, myalgias and neck pain.   Skin: Positive for rash.   Neurological: Negative for dizziness, tingling, tremors, sensory change, speech change and headaches.   Psychiatric/Behavioral: Negative for depression, hallucinations, substance abuse and suicidal ideas.             Past Medical History (Chronic medical problem, known complications and current treatment)    Past Medical History:   Diagnosis Date   • Heart murmur     Mitral Valve Prolapse          Past Surgical History:  Past Surgical History:   Procedure Laterality Date   • SHOULDER ARTHROSCOPY W/ ROTATOR CUFF REPAIR Left 4/6/2016    Procedure: SHOULDER ARTHROSCOPY W/ ROTATOR CUFF REPAIR;  Surgeon: Cayetano Little M.D.;  Location: [facility];  Service:    • SHOULDER DECOMPRESSION ARTHROSCOPIC Left 4/6/2016    Procedure: SHOULDER DECOMPRESSION ARTHROSCOPIC - SUBACROMIAL;  Surgeon: Cayetano Little M.D.;  Location: [facility];  Service:    • SHOULDER ARTHROSCOPY W/ BICIPITAL TENODESIS REPAIR Left 4/6/2016    Procedure: SHOULDER ARTHROSCOPY W/ BICIPITAL TENODESIS REPAIR;  Surgeon: Cayetano Little M.D.;  Location: [facility];  Service:    • SHOULDER ARTHROSCOPY Right 1990   • HERNIA REPAIR  1981   • OTHER  1979    Undescended testicle       Current Outpatient Medications:  Home Medications     Reviewed by Sophie Myers (Pharmacy Tech) on 09/12/19 at 2156  Med List Status: Complete   Medication Last Dose Status   diphenhydrAMINE (BENADRYL) 25 MG Tab 9/12/2019 Active   doxycycline  (VIBRAMYCIN) 100 MG Tab 9/12/2019 Active   predniSONE (DELTASONE) 20 MG Tab 9/12/2019 Active                Medication Allergy/Sensitivities:  No Known Allergies      Family History (mandatory)   Family History   Problem Relation Age of Onset   • No Known Problems Mother    • No Known Problems Father        Social History (mandatory)   Social History     Socioeconomic History   • Marital status:      Spouse name: Not on file   • Number of children: Not on file   • Years of education: Not on file   • Highest education level: Not on file   Occupational History   • Not on file   Social Needs   • Financial resource strain: Not on file   • Food insecurity:     Worry: Not on file     Inability: Not on file   • Transportation needs:     Medical: Not on file     Non-medical: Not on file   Tobacco Use   • Smoking status: Never Smoker   • Smokeless tobacco: Never Used   Substance and Sexual Activity   • Alcohol use: Yes     Frequency: 2-4 times a month     Comment: Occasionally   • Drug use: No   • Sexual activity: Not on file   Lifestyle   • Physical activity:     Days per week: Not on file     Minutes per session: Not on file   • Stress: Not on file   Relationships   • Social connections:     Talks on phone: Not on file     Gets together: Not on file     Attends Synagogue service: Not on file     Active member of club or organization: Not on file     Attends meetings of clubs or organizations: Not on file     Relationship status: Not on file   • Intimate partner violence:     Fear of current or ex partner: Not on file     Emotionally abused: Not on file     Physically abused: Not on file     Forced sexual activity: Not on file   Other Topics Concern   • Not on file   Social History Narrative   • Not on file     Living situation:   PCP : Solitario Michael M.D.    Physical Exam     Vitals:    09/12/19 1512 09/12/19 1515 09/12/19 1718 09/12/19 1900   BP: 127/77  123/74 131/83   Pulse: 60  (!) 53 (!) 51   Resp: 16  16   "  Temp: 36.2 °C (97.2 °F)  36.3 °C (97.3 °F)    TempSrc: Temporal  Temporal    SpO2: 99%  99% 98%   Weight:  82.5 kg (181 lb 14.1 oz)     Height: 1.88 m (6' 2\")        Body mass index is 23.35 kg/m².  O2 therapy: Pulse Oximetry: 98 %    Physical Exam   Constitutional: He is oriented to person, place, and time and well-developed, well-nourished, and in no distress.   HENT:   Head: Normocephalic and atraumatic.   Right Ear: External ear normal.   Left Ear: External ear normal.   Eyes: Pupils are equal, round, and reactive to light. Conjunctivae and EOM are normal.   Neck: Normal range of motion. Neck supple. No thyromegaly present.   Cardiovascular: Normal rate, regular rhythm and normal heart sounds.   Pulmonary/Chest: Effort normal and breath sounds normal. No respiratory distress. He has no wheezes.   Abdominal: Soft. Bowel sounds are normal. He exhibits no distension. There is no tenderness. There is no rebound.   Musculoskeletal: Normal range of motion. He exhibits no edema or deformity.   Neurological: He is alert and oriented to person, place, and time. Gait normal. GCS score is 15.   Skin: Skin is dry. Rash noted. There is erythema.   Psychiatric: Mood, memory, affect and judgment normal.         Data Review       Old Records Request:   Completed  Current Records review/summary: Completed    Lab Data Review:  Recent Results (from the past 24 hour(s))   CBC WITH DIFFERENTIAL    Collection Time: 09/12/19 12:02 PM   Result Value Ref Range    WBC 4.6 (L) 4.8 - 10.8 K/uL    RBC 5.00 4.70 - 6.10 M/uL    Hemoglobin 14.7 14.0 - 18.0 g/dL    Hematocrit 43.0 42.0 - 52.0 %    MCV 86.0 81.4 - 97.8 fL    MCH 29.4 27.0 - 33.0 pg    MCHC 34.2 33.7 - 35.3 g/dL    RDW 39.9 35.9 - 50.0 fL    Platelet Count 43 (LL) 164 - 446 K/uL    MPV 10.9 9.0 - 12.9 fL    Neutrophils-Polys 38.00 (L) 44.00 - 72.00 %    Lymphocytes 55.00 (H) 22.00 - 41.00 %    Monocytes 4.00 0.00 - 13.40 %    Eosinophils 0.00 0.00 - 6.90 %    Basophils 0.00 " 0.00 - 1.80 %    Nucleated RBC 0.00 /100 WBC    Neutrophils (Absolute) 1.89 1.82 - 7.42 K/uL    Lymphs (Absolute) 2.53 1.00 - 4.80 K/uL    Monos (Absolute) 0.18 0.00 - 0.85 K/uL    Eos (Absolute) 0.00 0.00 - 0.51 K/uL    Baso (Absolute) 0.00 0.00 - 0.12 K/uL    NRBC (Absolute) 0.00 K/uL   Comp Metabolic Panel    Collection Time: 09/12/19 12:02 PM   Result Value Ref Range    Sodium 138 135 - 145 mmol/L    Potassium 3.9 3.6 - 5.5 mmol/L    Chloride 102 96 - 112 mmol/L    Co2 23 20 - 33 mmol/L    Anion Gap 13.0 (H) 0.0 - 11.9    Glucose 98 65 - 99 mg/dL    Bun 11 8 - 22 mg/dL    Creatinine 1.00 0.50 - 1.40 mg/dL    Calcium 8.8 8.4 - 10.2 mg/dL    AST(SGOT) 145 (H) 12 - 45 U/L    ALT(SGPT) 85 (H) 2 - 50 U/L    Alkaline Phosphatase 49 30 - 99 U/L    Total Bilirubin 1.4 0.1 - 1.5 mg/dL    Albumin 3.8 3.2 - 4.9 g/dL    Total Protein 7.4 6.0 - 8.2 g/dL    Globulin 3.6 (H) 1.9 - 3.5 g/dL    A-G Ratio 1.1 g/dL   WESTERGREN SED RATE    Collection Time: 09/12/19 12:02 PM   Result Value Ref Range    Sed Rate Westergren 18 (H) 0 - 15 mm/hour   ESTIMATED GFR    Collection Time: 09/12/19 12:02 PM   Result Value Ref Range    GFR If African American >60 >60 mL/min/1.73 m 2    GFR If Non African American >60 >60 mL/min/1.73 m 2   DIFFERENTIAL MANUAL    Collection Time: 09/12/19 12:02 PM   Result Value Ref Range    Bands-Stabs 3.00 0.00 - 10.00 %    Manual Diff Status PERFORMED    PLATELET ESTIMATE    Collection Time: 09/12/19 12:02 PM   Result Value Ref Range    Plt Estimation Decreased    MORPHOLOGY    Collection Time: 09/12/19 12:02 PM   Result Value Ref Range    RBC Morphology Normal     Reactive Lymphocytes Moderate    IMMATURE PLT FRACTION    Collection Time: 09/12/19 12:02 PM   Result Value Ref Range    Imm. Plt Fraction 12.0 0.6 - 13.1 K/uL   LACTIC ACID    Collection Time: 09/12/19  7:35 PM   Result Value Ref Range    Lactic Acid 1.2 0.5 - 2.0 mmol/L   PT/INR    Collection Time: 09/12/19  7:35 PM   Result Value Ref Range    PT  13.0 12.0 - 14.6 sec    INR 0.96 0.87 - 1.13   PTT    Collection Time: 09/12/19  7:35 PM   Result Value Ref Range    APTT 26.6 24.7 - 36.0 sec   CRP QUANTITIVE (NON-CARDIAC)    Collection Time: 09/12/19  7:35 PM   Result Value Ref Range    Stat C-Reactive Protein 1.18 (H) 0.00 - 0.75 mg/dL   COD - Adult (Type and Screen)    Collection Time: 09/12/19  7:35 PM   Result Value Ref Range    ABO Grouping Only O     Rh Grouping Only POS     Antibody Screen-Cod NEG    CBC WITH DIFFERENTIAL    Collection Time: 09/12/19  7:35 PM   Result Value Ref Range    WBC 3.1 (L) 4.8 - 10.8 K/uL    RBC 4.49 (L) 4.70 - 6.10 M/uL    Hemoglobin 13.6 (L) 14.0 - 18.0 g/dL    Hematocrit 39.2 (L) 42.0 - 52.0 %    MCV 87.3 81.4 - 97.8 fL    MCH 30.3 27.0 - 33.0 pg    MCHC 34.7 33.7 - 35.3 g/dL    RDW 40.5 35.9 - 50.0 fL    Platelet Count 48 (LL) 164 - 446 K/uL    MPV 12.5 9.0 - 12.9 fL    Neutrophils-Polys 55.00 44.00 - 72.00 %    Lymphocytes 30.00 22.00 - 41.00 %    Monocytes 6.00 0.00 - 13.40 %    Eosinophils 0.00 0.00 - 6.90 %    Basophils 1.00 0.00 - 1.80 %    Nucleated RBC 0.00 /100 WBC    Neutrophils (Absolute) 1.86 1.82 - 7.42 K/uL    Lymphs (Absolute) 0.93 (L) 1.00 - 4.80 K/uL    Monos (Absolute) 0.19 0.00 - 0.85 K/uL    Eos (Absolute) 0.00 0.00 - 0.51 K/uL    Baso (Absolute) 0.03 0.00 - 0.12 K/uL    NRBC (Absolute) 0.00 K/uL   HEPATITIS PANEL ACUTE(4 COMPONENTS)    Collection Time: 09/12/19  7:35 PM   Result Value Ref Range    Hepatitis B Surface Antigen Negative Negative    Hepatitis B Cors Ab,IgM Negative Negative    Hepatitis A Virus Ab, IgM Negative Negative    Hepatitis C Antibody Negative Negative   HIV AG/AB COMBO ASSAY SCREENING    Collection Time: 09/12/19  7:35 PM   Result Value Ref Range    HIV Ag/Ab Combo Assay Non Reactive Non Reactive   FIBRINOGEN    Collection Time: 09/12/19  7:35 PM   Result Value Ref Range    Fibrinogen 387 215 - 460 mg/dL   PROCALCITONIN    Collection Time: 09/12/19  7:35 PM   Result Value Ref Range     Procalcitonin 0.17 <0.25 ng/mL   Comp Metabolic Panel    Collection Time: 09/12/19  7:35 PM   Result Value Ref Range    Sodium 137 135 - 145 mmol/L    Potassium 3.8 3.6 - 5.5 mmol/L    Chloride 108 96 - 112 mmol/L    Co2 20 20 - 33 mmol/L    Anion Gap 9.0 0.0 - 11.9    Glucose 140 (H) 65 - 99 mg/dL    Bun 17 8 - 22 mg/dL    Creatinine 0.87 0.50 - 1.40 mg/dL    Calcium 8.8 8.5 - 10.5 mg/dL    AST(SGOT) 113 (H) 12 - 45 U/L    ALT(SGPT) 74 (H) 2 - 50 U/L    Alkaline Phosphatase 41 30 - 99 U/L    Total Bilirubin 1.1 0.1 - 1.5 mg/dL    Albumin 3.7 3.2 - 4.9 g/dL    Total Protein 7.1 6.0 - 8.2 g/dL    Globulin 3.4 1.9 - 3.5 g/dL    A-G Ratio 1.1 g/dL   LDH    Collection Time: 09/12/19  7:35 PM   Result Value Ref Range    LDH Total 439 (H) 107 - 266 U/L   ESTIMATED GFR    Collection Time: 09/12/19  7:35 PM   Result Value Ref Range    GFR If African American >60 >60 mL/min/1.73 m 2    GFR If Non African American >60 >60 mL/min/1.73 m 2   DIFFERENTIAL MANUAL    Collection Time: 09/12/19  7:35 PM   Result Value Ref Range    Bands-Stabs 5.00 0.00 - 10.00 %    Metamyelocytes 1.00 %    Myelocytes 2.00 %    Manual Diff Status PERFORMED    PERIPHERAL SMEAR REVIEW    Collection Time: 09/12/19  7:35 PM   Result Value Ref Range    Peripheral Smear Review see below    PLATELET ESTIMATE    Collection Time: 09/12/19  7:35 PM   Result Value Ref Range    Plt Estimation Decreased    MORPHOLOGY    Collection Time: 09/12/19  7:35 PM   Result Value Ref Range    RBC Morphology Normal     Smudge Cells Few    IMMATURE PLT FRACTION    Collection Time: 09/12/19  7:35 PM   Result Value Ref Range    Imm. Plt Fraction 9.1 0.6 - 13.1 K/uL   ABO Rh Confirm    Collection Time: 09/12/19  8:20 PM   Result Value Ref Range    ABO Rh Confirm O POS        Imaging/Procedures Review:    Independant Imaging Review: Completed  No orders to display            EKG:   EKG Independent Review: Completed  QTc:, HR: , Normal Sinus Rhythm, no ST/T changes     Records  reviewed and summarized in current documentation :  Yes  [employer] teaching service handout given to patient:  Yes         Assessment/Plan     Purpura, idiopathic thrombocytopenia (HCC)  Assessment & Plan  44 yr old with acute onset of Non blanchable purpuric rash on B/L lower extremities from extending from great toe to shin, thrombocytopenia after a diarrheal illness during recent camping in [place]. Differentials include ITP vs TTP vs Vasculitis vs infectious. Less likely TTP as no associated microangiopathic hemolytic anemia or renal failure, no history of autoimmune diseases, less likely vasculitis, no associated systemic inflammation symptoms however, cannot rule out vasculitis at this point.  -Admit to medical floor  -supportive treatment for now as platelets are 45939 and no bleeding manifestations.  -we will consider platelet transfusion/IV immunoglobuins/steroids incase of drop in platelets or any bleeding manifestations.  -abdominal ultrasound to check for any spleen abnormalities.    Thrombocytopenia (HCC)  Assessment & Plan  Platelet count of 19581 associated with petechiae and purpuric rash after diarrheal illness during a recent camping/hunting trip in [place].  No bleeding manifestations now.  See A & P of ITP    Petechiae  Assessment & Plan  Multiple petechiae through out the body and oropharyngeal mucosa, likely due to thrombocytopenia. No bleeding manifestations as of now.  See A & P of ITP      Anticipated Hospital stay:  >2 midnights        Quality Measures  Quality-Core Measures  PCP: Solitario Michael M.D.

## 2019-09-13 NOTE — ED NOTES
Triage rounding: pt sitting up senior lounge with wife, nad. Vitals rechecked , stable. Apologized and updated with wait times

## 2019-09-13 NOTE — ASSESSMENT & PLAN NOTE
44 yr old with acute onset of Non blanchable purpuric rash all over the body ,predominantly on B/L lower extremities .   Labs show low platelet count in 40,000s . Along with low WBC and RBC count .  - Increased LDH   - Mild elevated Transaminases.  - mild elevated CRP  - Less likely TTP as no associated microangiopathic hemolytic anemia or renal failure.  thrombocytopenia after a flu like illness with low grade fever, myalgias and arthralgias along with diarrhea  After recent travel history ot [place] and camping in [place] increases the likely storm of mosquito or tick born zoonotic etiology but other differentials include  Automiimue with vasculitis vs Malignancy vs ITP .  Plan:   - Serology and PCR to rule out Infectious causes /zoonotic etiology - Rule out [place]/chickungunya/lymes/[place]ia/Hanta/Q fever.  - consult ID   -will do ARON with reflexology to rule out Autoimmune cause .  - consult Heme /oncho and will consider Bone marrow aspiration as a possible test if other tests are non diagnostic.  - Stool Wbcs since had diarrhea - to rule out infectious cause   - EKG as patient has bradycardia   - No platelet transfusion . Only supportive treatment for now as platelets are 45368 and no bleeding manifestations.  -Tourniquet test performed but inconclusive .  Start Empirical Doxycycline - 200 mg one time and then 100 mg BID for 10 days  - Consider steroids treatment after consulting ID and Heme-oncho.

## 2019-09-13 NOTE — ASSESSMENT & PLAN NOTE
44 yr old with acute onset of Non blanchable purpuric rash on B/L lower extremities from extending from great toe to shin, thrombocytopenia after a diarrheal illness during recent camping in [place]. Differentials include ITP vs TTP vs Vasculitis vs infectious. Less likely TTP as no associated microangiopathic hemolytic anemia or renal failure, no history of autoimmune diseases, less likely vasculitis, no associated systemic inflammation symptoms however, cannot rule out vasculitis at this point.  -Admit to medical floor  -supportive treatment for now as platelets are 80872 and no bleeding manifestations.  -we will consider platelet transfusion/IV immunoglobuins/steroids incase of drop in platelets or any bleeding manifestations.  -abdominal ultrasound to check for any spleen abnormalities.

## 2019-09-13 NOTE — ED PROVIDER NOTES
ED Provider Note    CHIEF COMPLAINT  Chief Complaint   Patient presents with   • Sent from Urgent Care   • Rash     diffuse red rash x2 days (worse to BLE). went hunting in [place] and came back with this rash.    • Abnormal Labs     Platelet Count low, elevated LFTs.        HPI  Sergio Castro is a 44 y.o. male who presents with diffuse petechial rash that started about 2 days ago on his ankles and has been spreading to the soles of his feet and up to bilateral lower extremities.  Also now on his upper extremities.  His core/torso is spared.    The patient has recent travel history.  Was in [place] for a week until September 1.  Had some diarrhea while there however never felt acutely ill.  He then went to [place] last Thursday about 1 week ago and was there for 5 days.  Notes that on Friday he had some diarrhea and felt feverish.  Denies any tick bites.  He was out in [facility] hunting.  No bloody stool.  No vomiting.  Has had a few episodes of diarrhea since then.  No continued fevers.  Was supposed to see his primary care physician today however went to urgent care where laboratory studies were performed.  He was found to have neutropenia along with thrombocytopenia.  He was referred to the ER for further management.  The patient has taken Benadryl for the rash without improvement.  He was also prescribed prednisone and doxycycline at the urgent care facility.    REVIEW OF SYSTEMS  See HPI for further details. All other systems are negative.     PAST MEDICAL HISTORY   has a past medical history of Heart murmur.    SOCIAL HISTORY  Social History     Tobacco Use   • Smoking status: Never Smoker   • Smokeless tobacco: Never Used   Substance and Sexual Activity   • Alcohol use: Yes     Frequency: 2-4 times a month     Comment: Occasionally   • Drug use: No   • Sexual activity: Not on file       SURGICAL HISTORY   has a past surgical history that includes shoulder arthroscopy (Right, 1990); other (1979);  "shoulder arthroscopy w/ rotator cuff repair (Left, 4/6/2016); shoulder decompression arthroscopic (Left, 4/6/2016); shoulder arthroscopy w/ bicipital tenodesis repair (Left, 4/6/2016); and hernia repair (1981).    CURRENT MEDICATIONS  Home Medications     Reviewed by Gregorio Mckinney R.N. (Registered Nurse) on 09/12/19 at 1519  Med List Status: Partial   Medication Last Dose Status   doxycycline (VIBRAMYCIN) 100 MG Tab  Active   predniSONE (DELTASONE) 20 MG Tab  Active                ALLERGIES  No Known Allergies    PHYSICAL EXAM  VITAL SIGNS: /74   Pulse (!) 53   Temp 36.3 °C (97.3 °F) (Temporal)   Resp 16   Ht 1.88 m (6' 2\")   Wt 82.5 kg (181 lb 14.1 oz)   SpO2 99%   BMI 23.35 kg/m²   Pulse ox interpretation: I interpret this pulse ox as normal.  Constitutional: Alert in no apparent distress.  HENT: No signs of trauma, Bilateral external ears normal, Nose normal.  Slightly erythematous posterior pharynx.  No obvious tonsillar swelling.  Eyes: Pupils are equal and reactive, Conjunctiva normal, Non-icteric.   Neck: Normal range of motion, No tenderness, Supple, No stridor.   Cardiovascular: Regular rate and rhythm.   Thorax & Lungs: Normal breath sounds, No respiratory distress, No wheezing, No chest tenderness.   Abdomen: Bowel sounds normal, Soft, No tenderness, No masses, No pulsatile masses. No peritoneal signs.  Skin: Warm, Dry, No erythema, diffuse petechial rash over the entire lower extremities with purpura over the bilateral ankles extending over to the soles of the feet.  Faint petechial rash on the upper extremities.  No petechiae over the torso or face.  Back: No bony tenderness, No CVA tenderness.   Extremities: Intact distal pulses, No edema, No tenderness, No cyanosis  Musculoskeletal: Good range of motion in all major joints. No tenderness to palpation or major deformities noted.   Neurologic: Alert, Normal motor function and gait, Normal sensory function, No focal deficits noted. " "  Psychiatric: Affect normal, Judgment normal, Mood normal.       DIAGNOSTIC STUDIES / PROCEDURES    LABS  Labs Reviewed   CRP QUANTITIVE (NON-CARDIAC) - Abnormal; Notable for the following components:       Result Value    Stat C-Reactive Protein 1.18 (*)     All other components within normal limits    Narrative:     Indicate which anticoagulants the patient is on:->NONE   CBC WITH DIFFERENTIAL - Abnormal; Notable for the following components:    WBC 3.1 (*)     RBC 4.49 (*)     Hemoglobin 13.6 (*)     Hematocrit 39.2 (*)     Platelet Count 48 (*)     Lymphs (Absolute) 0.93 (*)     All other components within normal limits   COMP METABOLIC PANEL - Abnormal; Notable for the following components:    Glucose 140 (*)     AST(SGOT) 113 (*)     ALT(SGPT) 74 (*)     All other components within normal limits    Narrative:     Indicate which anticoagulants the patient is on:->NONE   LDH - Abnormal; Notable for the following components:    LDH Total 439 (*)     All other components within normal limits    Narrative:     Indicate which anticoagulants the patient is on:->NONE   BLOOD CULTURE    Narrative:     Per Hospital Policy: Only change Specimen Src: to \"Line\" if  specified by physician order.   BLOOD CULTURE    Narrative:     Per Hospital Policy: Only change Specimen Src: to \"Line\" if  specified by physician order.   LACTIC ACID    Narrative:     Indicate which anticoagulants the patient is on:->NONE   PROTHROMBIN TIME    Narrative:     Indicate which anticoagulants the patient is on:->NONE   APTT    Narrative:     Indicate which anticoagulants the patient is on:->NONE   COD (ADULT)   HEPATITIS PANEL ACUTE(4 COMPONENTS)   HIV AG/AB COMBO ASSAY SCREENING   FIBRINOGEN    Narrative:     Indicate which anticoagulants the patient is on:->NONE   PROCALCITONIN    Narrative:     Indicate which anticoagulants the patient is on:->NONE   ABO RH CONFIRM   ESTIMATED GFR    Narrative:     Indicate which anticoagulants the patient is " on:->NONE   DIFFERENTIAL MANUAL   PERIPHERAL SMEAR REVIEW   PLATELET ESTIMATE   MORPHOLOGY   IMMATURE PLT FRACTION   CBC WITH DIFFERENTIAL   COMP METABOLIC PANEL   ARON REFLEXIVE PROFILE   H.PYLORI STOOL ANTIGEN   URINALYSIS         COURSE & MEDICAL DECISION MAKING      Pertinent Labs & Imaging studies reviewed. (See chart for details)  44 y.o. male presenting with diffuse petechial rash involving the lower extremities starting at the ankles and spreading from there.  Currently involving the soles of his feet.  No involvement of the torso.  He was evaluated at urgent care and found to have significant thrombocytopenia along with leukopenia.  He was transferred here for further evaluation.  Currently the patient is asymptomatic.  No fevers.  No altered mental status.  No new medications.  Patient has a recent travel history to [place] for 1 week in late last month and just recently returned from a hunting trip in [place].  No known tick bites.  Had some diarrhea while in [place] and following his trip to [place].  Reports only 1 or 2 episodes of diarrhea.  Not profuse.  Not bloody.  No associated known fevers at home though did have some subjective fevers a few days ago.    Patient is currently resting comfortably with normal vital signs.    Lower suspicion for [place] spotted fever as this is a rather rare disease process.  Patient had development of symptoms just the day after arriving in [place].  No known tick bite history.  Given the timeline, the patient did not have adequate incubation time prior to onset of symptoms.  Cannot fully rule out this disease process however.    Patient does not have known medications or known medical history.  No evidence of renal failure.  No active known fevers.  Less likely HUS or TTP.  No evidence of anemia.  Bilirubin is normal.  No evidence of a microangiopathic hemolytic anemia.    No obvious signs of a consumptive process such as DIC.  No active signs of infection at  "this time.  Normal fibrinogen.    ITP is a possibility though has a rather prominent petechial diffuse rash on the extremities.  Patient does not have active bleeding at this time.    Contacted oncology, Dr Mckoy.  Will follow the patient.  Recommending admission to the hospital for further monitoring.      8:56 PM spoke with [facility] IM service.  Agreeable with admission.    /87   Pulse (!) 47 Comment: RN notified   Temp 36.4 °C (97.5 °F) (Temporal)   Resp 16   Ht 1.88 m (6' 2\")   Wt 82.5 kg (181 lb 14.1 oz)   SpO2 98%   BMI 23.35 kg/m²       FINAL IMPRESSION  Thrombocytopenia  Leukopenia  Petechial rash      Electronically signed by: Camilo Craven, 9/12/2019 6:24 PM    "

## 2019-09-13 NOTE — ASSESSMENT & PLAN NOTE
[place] count of [address] associated with petechiae and purpuric rash after diarrheal illness during a recent camping/hunting trip in [place].  No bleeding manifestations now.  See A & P for petechial rash

## 2019-09-13 NOTE — CONSULTS
DATE OF SERVICE:  09/13/2019    INFECTIOUS DISEASE CONSULTATION    REQUESTING PHYSICIAN:  NATALEE Hernandez MD    REASON FOR CONSULTATION:  Vector-borne illness with fever and decreased   platelets.    CONSULTING PHYSICIAN:  Lior Kunz MD    HISTORY OF PRESENT ILLNESS:  The patient is a 44-year-old male with no   significant past medical history who presented to the emergency room after   developing a petechial rash, feeling ill, generally several days post a   diarrheal illness.    This is something new for the patient.  He has not had issues like this in the   past.  He recently was in [place] at the lateral part of August with a return   date on 09/01.  At that vacation, he stayed in a private residence where the   food was prepared for them by local people.  He did have a small diarrheal   illness that period of time as did everyone else, yet that had recovered prior   to returning to the [place].  He did not report any nausea, vomiting,   or fever like syndrome.  He reported his diarrhea was nonbloody.    After return, he had set out on a solo trip to [place] where he was going   hunting.  He prepared his own food for the trip.  He noted that after several   days being out in the wild, he started developing significant diarrheal   illness once again.  He felt extremely ill and fatigued.  This happened one   night after sleeping under the stars by himself.  The rest of the trip, he was   in his tent.  He reports drinking only water that was in a bottle or eyal   had prepared.  He does not remember any tick bites or any other type of vector   bite to his recollection.  He did not notice any other strange foods that he   had eaten out could have brought to illness.  He did not have any vomiting and   his diarrhea was nonbloody.  This continued over for 5 days up until him   returning home when he did started having regular bowel movements once again.    He presented to the emergency room though after  a diffuse rash presented over   his entire body with petechial rash in the bilateral lower extremities   and feet.    He did report lots of mosquito bites in [place].  No other bites that he is   aware of any other time for other organisms to include ticks.  He has not had   any meningeal signs that he can relate and he already feels quite well at this   time.    Upon evaluation on admission, he presented with a baseline low heart rate in   the upper 40s-60s, blood pressure was stable.  His white cell count was low at   3.1.  His platelets were at 48.  His LDH was also elevated at 439.    Additional details received noted that he did seek some attention prior to   admission where on the outside evaluation he took doxycycline for several days   as well as some prednisone.  He stopped these before admission now for   unclear reasons.    The patient currently denies any fever, chills, night sweats, nausea,   vomiting, diarrhea, chest pain, palpitation, shortness of breath, productive   cough, abdominal pain, dysuria, hematuria, unilateral lower extremity calf   tenderness, or unilateral weakness.  His hepatitis panel and HIV are both   negative.    PAST MEDICAL HISTORY:  1.  Heart murmur with known mitral valve prolapse.  2.  Shoulder arthroscopy on the left for rotator cuff repair.  3.  Shoulder decompression on the left.  4.  Bicipital tenodesis repair.  5.  Hernia repair.  6.  Undescended testicle repair.    FAMILY HISTORY:  No known early heart attacks, strokes, diabetes, or cancer   that he is aware of.    SOCIAL HISTORY:  Social alcohol, nonsmoker, no illicit drugs.  He is .    PRIMARY CARE PHYSICIAN:  Solitario Michael MD    REVIEW OF SYSTEMS:  Please see the HPI.  All other systems negative on A   14-point AMA/CMS.    PHYSICAL EXAMINATION:  VITAL SIGNS:  Temperature 97.3, pulse 56, respiratory rate 18, blood pressure   131/83, and saturating 98% on room air.  GENERAL:  The patient is sitting in bed, in  no apparent distress.  HEENT:  Pupils equal, reactive.  Moist mucous membranes, no scleral icterus.    No conjunctival pallor.  No conjunctival hemorrhage.  No lymphadenopathy.    Dentition in good repair.  CARDIOVASCULAR:  Normal rate, regular rhythm.  No obvious murmur.  PULMONARY:  Clear to auscultation bilaterally.  No crackles or wheezes   appreciated.  ABDOMEN:  Nontender, nondistended.  Bowel sounds normoactive.  EXTREMITIES:  No edema.  NEUROLOGIC:  Grossly nonfocal.  SKIN:  Rash noted to be purpuric in the shin down to his toes.  Otherwise, no   obvious petechial rash demonstrated other than in the buttock region.  PSYCHIATRIC:  Appropriate mood and affect.    LABORATORY DATA:  1.  WBC 3.9, hemoglobin 13.3, hematocrit 38.7, and platelets 51.  2.   Sodium 138, potassium 4.1, chloride 108, bicarbonate 22, BUN 15,   creatinine 0.86, and glucose 117.  3.  Calcium 8.9, AST 9, ALT 65, alkaline phosphatase 37, total bilirubin 1.1,   albumin 3.5, globulin 3.4.  .  Lactic acid 1.2.  4.  Hepatitis panel negative.  HIV negative.  Rapid Strep negative.    MICROBIOLOGY:  On 09/12/2019, blood cultures no growth to date.    IMPRESSION:  1.  Sepsis secondary to likely vector-borne illness.  2.  Thrombocytopenia.  3.  Heart murmur.    PLAN:  The patient's current disease state probably is a vector-borne illness   to some degree.  He has several different exposures that could have lead to   this illness to include that of something down in [place] to include dengue and   chikungunya, doubt malaria.  Other etiologies that were more related locally   to the [place] region would be rickettsial disease, tick-borne illnesses, or   enteroviral disease.  Borrelia panel should be ordered as well as enteroviral   PCR.  Chikungunya and dengue IgG and IgM will be done to see that.  A   hunt has been coordinated through the internal medicine team as well as C.   burnetii, West Nile, Lyme, and H. pylori as well as EBV and typhus  testing.    We will follow up on all these treatments to see if they pan out, may   certainly just require supportive care in the moment, but doxycycline would be   appropriate.  The patient was on steroid briefly, which may change our   ability to assess his overall inflammatory state, but ultimately the other   testing not currently out will give us more estimation of his disease process.    No other significant antibiotic needs are required at this time.  I do not   feel that he is contagious to anyone.  An ARON was done to look at other   causes.  We will follow closely in the hospital.  No need for biopsy or other   investigation at this time.  I discussed the case with Dr. Hernandez as well   as the RN, wife, patient, and lab.    Thank you for allowing me to take part in this patient's care.  Greater than   65 minutes of care time was used during this encounter, over 50% of that time   was in direct face-to-face care, counseling, and discussion.       ____________________________________     MD MOHAN Rm / CYN    DD:  09/13/2019 14:02:44  DT:  09/13/2019 15:43:04    D#:  [id number]  Job#:  [id number]

## 2019-09-14 ENCOUNTER — PATIENT OUTREACH (OUTPATIENT)
Dept: HEALTH INFORMATION MANAGEMENT | Facility: OTHER | Age: 44
End: 2019-09-14

## 2019-09-14 VITALS
SYSTOLIC BLOOD PRESSURE: 122 MMHG | HEART RATE: 47 BPM | OXYGEN SATURATION: 98 % | TEMPERATURE: 97.9 F | RESPIRATION RATE: 16 BRPM | BODY MASS INDEX: 23.34 KG/M2 | HEIGHT: 74 IN | DIASTOLIC BLOOD PRESSURE: 74 MMHG | WEIGHT: 181.88 LBS

## 2019-09-14 LAB
APTT PPP: 24.4 SEC (ref 24.7–36)
BASOPHILS # BLD AUTO: 0.9 % (ref 0–1.8)
BASOPHILS # BLD: 0.05 K/UL (ref 0–0.12)
BLD GP AB SCN SERPL QL: NORMAL
EOSINOPHIL # BLD AUTO: 0.22 K/UL (ref 0–0.51)
EOSINOPHIL NFR BLD: 3.6 % (ref 0–6.9)
ERYTHROCYTE [DISTWIDTH] IN BLOOD BY AUTOMATED COUNT: 42.6 FL (ref 35.9–50)
FIBRINOGEN PPP-MCNC: 414 MG/DL (ref 215–460)
FOLATE SERPL-MCNC: 11.4 NG/ML
HCT VFR BLD AUTO: 41.6 % (ref 42–52)
HGB BLD-MCNC: 13.7 G/DL (ref 14–18)
LDH SERPL L TO P-CCNC: 345 U/L (ref 107–266)
LYMPHOCYTES # BLD AUTO: 2.3 K/UL (ref 1–4.8)
LYMPHOCYTES NFR BLD: 38.4 % (ref 22–41)
MANUAL DIFF BLD: NORMAL
MCH RBC QN AUTO: 29.5 PG (ref 27–33)
MCHC RBC AUTO-ENTMCNC: 32.9 G/DL (ref 33.7–35.3)
MCV RBC AUTO: 89.5 FL (ref 81.4–97.8)
METAMYELOCYTES NFR BLD MANUAL: 1.8 %
MONOCYTES # BLD AUTO: 0.53 K/UL (ref 0–0.85)
MONOCYTES NFR BLD AUTO: 8.9 % (ref 0–13.4)
MORPHOLOGY BLD-IMP: NORMAL
MYELOCYTES NFR BLD MANUAL: 0.9 %
NEUTROPHILS # BLD AUTO: 2.73 K/UL (ref 1.82–7.42)
NEUTROPHILS NFR BLD: 45.5 % (ref 44–72)
NRBC # BLD AUTO: 0 K/UL
NRBC BLD-RTO: 0 /100 WBC
NUCLEAR IGG SER QL IA: NORMAL
PLATELET # BLD AUTO: 91 K/UL (ref 164–446)
PLATELET BLD QL SMEAR: NORMAL
PMV BLD AUTO: 11.9 FL (ref 9–12.9)
RBC # BLD AUTO: 4.65 M/UL (ref 4.7–6.1)
RBC BLD AUTO: PRESENT
TSH SERPL DL<=0.005 MIU/L-ACNC: 2.52 UIU/ML (ref 0.38–5.33)
VARIANT LYMPHS BLD QL SMEAR: NORMAL
VIT B12 SERPL-MCNC: 684 PG/ML (ref 211–911)
WBC # BLD AUTO: 6 K/UL (ref 4.8–10.8)

## 2019-09-14 PROCEDURE — 86790 VIRUS ANTIBODY NOS: CPT

## 2019-09-14 PROCEDURE — 700102 HCHG RX REV CODE 250 W/ 637 OVERRIDE(OP): Performed by: STUDENT IN AN ORGANIZED HEALTH CARE EDUCATION/TRAINING PROGRAM

## 2019-09-14 PROCEDURE — A9270 NON-COVERED ITEM OR SERVICE: HCPCS | Performed by: STUDENT IN AN ORGANIZED HEALTH CARE EDUCATION/TRAINING PROGRAM

## 2019-09-14 PROCEDURE — 85027 COMPLETE CBC AUTOMATED: CPT

## 2019-09-14 PROCEDURE — 82607 VITAMIN B-12: CPT

## 2019-09-14 PROCEDURE — 99239 HOSP IP/OBS DSCHRG MGMT >30: CPT | Mod: GC | Performed by: HOSPITALIST

## 2019-09-14 PROCEDURE — 36415 COLL VENOUS BLD VENIPUNCTURE: CPT

## 2019-09-14 PROCEDURE — 86850 RBC ANTIBODY SCREEN: CPT

## 2019-09-14 PROCEDURE — 86665 EPSTEIN-BARR CAPSID VCA: CPT | Mod: 91

## 2019-09-14 PROCEDURE — 83615 LACTATE (LD) (LDH) ENZYME: CPT

## 2019-09-14 PROCEDURE — 86663 EPSTEIN-BARR ANTIBODY: CPT

## 2019-09-14 PROCEDURE — 86664 EPSTEIN-BARR NUCLEAR ANTIGEN: CPT

## 2019-09-14 PROCEDURE — 85730 THROMBOPLASTIN TIME PARTIAL: CPT

## 2019-09-14 PROCEDURE — 85007 BL SMEAR W/DIFF WBC COUNT: CPT

## 2019-09-14 PROCEDURE — 84443 ASSAY THYROID STIM HORMONE: CPT

## 2019-09-14 PROCEDURE — 85384 FIBRINOGEN ACTIVITY: CPT

## 2019-09-14 PROCEDURE — 82746 ASSAY OF FOLIC ACID SERUM: CPT

## 2019-09-14 RX ORDER — DOXYCYCLINE 100 MG/1
100 TABLET ORAL EVERY 12 HOURS
Qty: 18 TAB | Refills: 0 | Status: SHIPPED | OUTPATIENT
Start: 2019-09-14 | End: 2019-09-23

## 2019-09-14 RX ADMIN — DOXYCYCLINE 100 MG: 100 TABLET, FILM COATED ORAL at 06:33

## 2019-09-14 ASSESSMENT — ENCOUNTER SYMPTOMS
NAUSEA: 0
CONSTIPATION: 0
COUGH: 0
PALPITATIONS: 0
FEVER: 0
VOMITING: 0
CHILLS: 0
DIARRHEA: 0
ABDOMINAL PAIN: 0

## 2019-09-14 NOTE — DISCHARGE INSTRUCTIONS
Discharge Instructions    Discharged to home by car with relative. Discharged via walking, hospital escort: Yes.  Special equipment needed: Not Applicable    Be sure to schedule a follow-up appointment with your primary care doctor or any specialists as instructed.     Discharge Plan:   Diet Plan: Discussed  Activity Level: Discussed  Confirmed Follow up Appointment: Patient to Call and Schedule Appointment  Confirmed Symptoms Management: Discussed  Medication Reconciliation Updated: Yes  Influenza Vaccine Indication: Patient Refuses    I understand that a diet low in cholesterol, fat, and sodium is recommended for good health. Unless I have been given specific instructions below for another diet, I accept this instruction as my diet prescription.   Other diet: regular    Special Instructions: None    · Is patient discharged on Warfarin / Coumadin?   No     Depression / Suicide Risk    As you are discharged from this [facility] facility, it is important to learn how to keep safe from harming yourself.    Recognize the warning signs:  · Abrupt changes in personality, positive or negative- including increase in energy   · Giving away possessions  · Change in eating patterns- significant weight changes-  positive or negative  · Change in sleeping patterns- unable to sleep or sleeping all the time   · Unwillingness or inability to communicate  · Depression  · Unusual sadness, discouragement and loneliness  · Talk of wanting to die  · Neglect of personal appearance   · Rebelliousness- reckless behavior  · Withdrawal from people/activities they love  · Confusion- inability to concentrate     If you or a loved one observes any of these behaviors or has concerns about self-harm, here's what you can do:  · Talk about it- your feelings and reasons for harming yourself  · Remove any means that you might use to hurt yourself (examples: pills, rope, extension cords, firearm)  · Get professional help from the community (Mental  Health, Substance Abuse, psychological counseling)  · Do not be alone:Call your Safe Contact- someone whom you trust who will be there for you.  · Call your local CRISIS HOTLINE 063-0568 or 487-510-2116  · Call your local Children's Mobile Crisis Response Team [place] (580) 252-1158 or www.[employer]  · Call the toll free National Suicide Prevention Hotlines   · National Suicide Prevention Lifeline 953-672-WWOT (9438)  · [employer] Hope Line Network 800-SUICIDE (300-0161)      Antibiotic Medicine  Introduction  Antibiotic medicines are used to treat infections caused by bacteria. They work by hurting or killing the germs that are making you sick.  How will my medicine be picked?  There are many kinds of antibiotic medicines. To help your doctor pick one, tell your doctor if:  · You have any allergies.  · You are pregnant or plan to get pregnant.  · You are breastfeeding.  · You are taking any medicines. These include over-the-counter medicines, prescription medicines, and herbal remedies.  · You have a medical condition or problem.  If you have questions about why your medicine was picked, ask.  For how long should I take my medicine?  Take your medicine for as long as your doctor tells you to. Do not stop taking it when you feel better. If you stop taking it too soon:  · You may start to feel sick again.  · Your infection may get harder to treat.  · New problems may develop.  What if I miss a dose?  Try not to miss any doses of antibiotic medicine. If you miss a dose:  · Take the dose as soon as you can.  · If you are taking 2 doses a day, take the next dose in 5 to 6 hours.  · If you are taking 3 or more doses a day, take the next dose in 2 to 4 hours. Then go back to the normal schedule.  If you cannot take a missed dose, take the next dose on time. Then take the missed dose after you have taken all the doses as told by your doctor, as if you had one more dose left.  Does this medicine affect birth  control?  Birth control pills may not work while you are on antibiotic medicines. If you are taking birth control pills, keep taking them as usual. Use a second form of birth control, such as a condom. Keep using the second form of birth control until you are finished with your current 1 month cycle of birth control pills.  Get help if:  · You get worse.  · You do not feel better a few days after starting the medicine.  · You throw up (vomit).  · There are white patches in your mouth.  · You have new joint pain after starting the medicine.  · You have new muscle aches after starting the medicine.  · You had a fever before starting the medicine, and it comes back.  · You have any symptoms of an allergic reaction, such as an itchy rash. If this happens, stop taking the medicine.  Get help right away if:  · Your pee (urine) turns dark or becomes blood-colored.  · Your skin turns yellow.  · You bruise or bleed easily.  · You have very bad watery poop (diarrhea) and cramps in your belly (abdomen).  · You have a very bad headache.  · You have signs of a very bad allergic reaction, such as:  ¨ Trouble breathing.  ¨ Wheezing.  ¨ Swelling of the lips, tongue, or face.  ¨ Fainting.  ¨ Blisters on the skin or in the mouth.  If you have signs of a very bad allergic reaction, stop taking the antibiotic medicine right away.  This information is not intended to replace advice given to you by your health care provider. Make sure you discuss any questions you have with your health care provider.  Document Released: 09/26/2009 Document Revised: 08/15/2017 Document Reviewed: 05/04/2016  © 2017 [employer]vier

## 2019-09-14 NOTE — PROGRESS NOTES
Pt educated regarding discharge instructions, medications, and follow up appointments. Questions and concerns addressed. IV removed. Pt walked self out.

## 2019-09-14 NOTE — PROGRESS NOTES
Assumed care from day shift nurse. Alertx4, able to make needs known. No complaints of pain or distress. On RA without SOB. Updated regarding plan of care. All needs tended to. Will continue to monitor

## 2019-09-14 NOTE — NON-PROVIDER
Internal Medicine Interval Note  Note Author: Hortencia Cruzbrisadelilahkimber, Student     Name Sergio Castro     1975   Age/Sex 44 y.o. male   MRN [id number]   Code Status Full Code     After 5PM or if no immediate response to page, please call for cross-coverage  Attending/Team: Dr. Hernandez/Adryan See Patient List for primary contact information  Call [phone] to page    1st Call - Day Intern (R1):   Dr. Leti Mckeon 2nd Call - Day Sr. Resident (R2/R3):   Dr. Schaffer         Reason for interval visit  (Principal Problem)   Petechial Rash, thrombocytopenia, faigue    Interval Problem Daily Status Update  (24 hours)   Patient is a 43 yo male w/ a history of mitral valve prolapse as a child presented to the ED on  for rash and fatigue. No acute overnight events. Patient stays he feels 100% normal besides the rash.    Petechial Rash: Patient states that it has not spread and is looking a little better today. Solid erythema is still present bilaterally on foot and shins.     Pancytopenia: Plts 91 today. H/H 13.7 and 41.6, wbc 6. Labs are trending upward and starting to normalize. .    Bradycardia: ECG showed sinus bradycardia with LVH.    Review of Systems   Constitutional: Negative for chills, fever and malaise/fatigue.   Respiratory: Negative for cough.    Cardiovascular: Negative for chest pain and palpitations.   Gastrointestinal: Negative for abdominal pain, constipation, diarrhea, nausea and vomiting.   Skin: Positive for rash. Negative for itching.       Disposition  D/C home f/u with Dr. Kunz next week    Quality Measures  Quality-Core Measures   Reviewed items::  EKG reviewed, Labs reviewed and Medications reviewed  Disla catheter::  No Disla  DVT prophylaxis pharmacological::  Not indicated at this time, ambulatory      Physical Exam       Vitals:    19 0800 19 1500 19 1935 19 0405   BP: 136/84 138/83 130/79 134/77   Pulse: (!) 44 (!) 48 (!) 57 (!) 49   Resp: 16 17 18  18   Temp: 36.8 °C (98.3 °F) 36.7 °C (98.1 °F) 36.8 °C (98.3 °F) 37 °C (98.6 °F)   TempSrc: Temporal Temporal Temporal Temporal   SpO2: 97% 100% 97% 98%   Weight:       Height:         Body mass index is 23.35 kg/m².    Oxygen Therapy:  Pulse Oximetry: 98 %, O2 Delivery: None (Room Air)    Physical Exam   Constitutional:   Patient is in good physical shape, NAD   Cardiovascular: Regular rhythm and intact distal pulses. Exam reveals no gallop and no friction rub.   Murmur (diastolic over mitral valve) heard.  Bradycardic   Pulmonary/Chest: Effort normal and breath sounds normal. No respiratory distress. He has no wheezes. He has no rales.   Skin: Rash: Rash is petechial , non blanching ,non painful/tender  all over the body including oral mucosa. Worse bilaterally on LEs. Coalescing over the lower limbs - From feet to the knees involving sole of the foot.       Lab Data Review:     9/14/2019  7:33 AM    Recent Labs     09/12/19 1202 09/12/19 1935 09/13/19 0329   SODIUM 138 137 138   POTASSIUM 3.9 3.8 4.1   CHLORIDE 102 108 108   CO2 23 20 22   BUN 11 17 15   CREATININE 1.00 0.87 0.86   CALCIUM 8.8 8.8 8.9       Recent Labs     09/12/19 1202 09/12/19 1935 09/13/19 0329   ALTSGPT 85* 74* 65*   ASTSGOT 145* 113* 90*   ALKPHOSPHAT 49 41 37   TBILIRUBIN 1.4 1.1 1.1   GLUCOSE 98 140* 117*       Recent Labs     09/12/19 1935 09/13/19 0329 09/14/19 0311   RBC 4.49* 4.45* 4.65*   HEMOGLOBIN 13.6* 13.3* 13.7*   HEMATOCRIT 39.2* 38.7* 41.6*   PLATELETCT 48* 51* 91*   PROTHROMBTM 13.0  --   --    APTT 26.6  --  24.4*   INR 0.96  --   --        Recent Labs     09/12/19  1202 09/12/19  1935 09/13/19  0329 09/14/19  0311   WBC 4.6* 3.1* 3.9* 6.0   NEUTSPOLYS 38.00* 55.00 48.70 45.50   LYMPHOCYTES 55.00* 30.00 38.90 38.40   MONOCYTES 4.00 6.00 4.40 8.90   EOSINOPHILS 0.00 0.00 0.00 3.60   BASOPHILS 0.00 1.00 0.90 0.90   ASTSGOT 145* 113* 90*  --    ALTSGPT 85* 74* 65*  --    ALKPHOSPHAT 49 41 37  --    TBILIRUBIN 1.4 1.1  1.1  --        Assessment/Plan     Petechial rash  Assessment & Plan  44 yr old with positive travel history presented with acute onset of non blanchable purpuric rash all over the body, predominantly on B/L lower extremities .   Labs show low platelet count in 40,000s . Along with low WBC and RBC count . Labs are beginning to normalize. ID and hem/onc consulted. Most likely a zoonotic infection due to recent history of traveling to [place] and [place]. Still could be an autoimmune condition like ITP, but pancytopenia makes this less likely. Patient states that he is feeling fine and rash is starting to subside. Disease may of ran it's course. No stool wbc.  -serology and PCR to evaluate for zoonotic etiology- differentials include chikungunya/West nile/ RMSF/ Lymes/ Hanta  -continue doxy empirically 100mg BID for 10 days  -F/U with Dr. Kunz next week    Pancytopenia, acquired (HCC)  Assessment & Plan  Starting to normalize. Patient still has a normocytic anemia and thrombocytopenia. Associated with petechial rash. Etiology is unknown. Unlikely deficit with bone marrow, biopsy not needed   -Serology and PCR pending  -ARON pending    Murmur  Assessment & Plan      Patient states that he was dx with MVP as a child. Diastolic murmur heard over mitral valve.  ECG revealed LVH. Patient's bp has been stable most likely related to MVP.       -outpatient echo to assess

## 2019-09-14 NOTE — DISCHARGE SUMMARY
Internal Medicine Discharge Summary  Note Author: Mee Schaffer M.D.       Name Sergio Castro     1975   Age/Sex 44 y.o. male   MRN [id number]         Admit Date:  2019       Discharge Date:   2019    Service:   [facility] Internal Medicine Gray Team  Attending Physician(s):   Dr. Hernandez       Senior Resident(s):   Dr. Schaffer  Jesus Resident(s):   Dr. Mckeon  PCP: Solitario Michael M.D.      Primary Diagnosis:   Pancytopenia with more pronounced thrombocytopenia.    Secondary Diagnoses:                Active Problems:    Pancytopenia, acquired (HCC) POA: Unknown    Petechial rash POA: Unknown    Thrombocytopenia (HCC) POA: Unknown  Resolved Problems:    * No resolved hospital problems. 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  Patient went to [facility] for traveling about 2 weeks ago, then [place] for hunting in the last week. He started having flulike symptoms (fever, arthralgia, diarrhea ) while in the trip which lasted for about 3 days.  Then he developed macular rash which started around the ankle / dorsum of the feet area bilaterally, gradually progressing upwards above the knee, coalescing, nontender, nonblanching.  He was seen in urgent care for the rash initially where he was given doxycycline and prednisone, but he was found to be pancytopenic with severe thrombocytopenia so he was sent to ER for further evaluation. He was hemodynamically stable through out his hospital stay.     Thrombocytopenia: Unclear etiology, likely from viral illness. Infectious disease (Dr. Kunz) saw him while inpatient. His coags and fibrinogen was normal - so unlikely to be DIC, no schistocytes were seen on smear, normal renal function -unlikely TTP/HUS.  Hepatitis panel/HIV negative.  ESR not suggestive of autoimmune disease.   Serology for multiple viral illness (dengue/chikumguniya/RMSF/Q fever) pending.  Improving with doxycycline.  Heme/ onc was consulted while inpatient -no further recommendation at this point.  Patient to complete the antibiotic course and follow-up with infectious disease clinic as outpatient, if needed /thrombocytopenia not improved with antibiotic then follow-up with heme/ onc clinic too.    Bilateral macular rash: Most likely Zoonotic infection given his recent travel history - (chikungunyia / Dengue -serology pending) or Rickettsia/other spirochete infection from [place] (serology pending). Empiric doxycycline was started, patient symptoms improved, rash seems to be clearing up.  Platelet and LDH is improving.  He can finish his total of 10 days of antibiotic course from home, he was instructed to follow-up with infectious disease clinic in the next week for follow-up of his pending results and further need of antibiotics.    Murmur: Patient does have a history of mitral valve prolapse, he did have diastolic murmur/late systolic decrescendo on auscultation in his mitral area -suspicious for mitral stenosis /MVR with regurgitation?  Outpatient echocardiogram has been ordered, patient was instructed to follow-up with PCP with results.    Diarrhea: Resolved, most likely viral.  Stool WBC was negative.    Consultants:     Infectious disease: Dr. Kunz  Hemato-oncology: Dr. Mckoy    Procedures:        NA    Imaging/ Testing:      DX-CHEST-2 VIEWS   Final Result      No acute cardiopulmonary abnormality.      US-ABDOMEN COMPLETE SURVEY   Final Result      1.  Normal liver and gallbladder. No gallstones.   2.  Low level echoes within the bladder. Correlation with urinalysis to exclude UTI.         EC-ECHOCARDIOGRAM COMPLETE W/O CONT    (Results Pending)         Discharge Medications:         Medication Reconciliation: Completed       Medication List      START taking these medications      Instructions    doxycycline monohydrate 100 MG tablet  Commonly known as:  ADOXA   Take 1 Tab by mouth every 12 hours for 9 days.  Dose:  100 mg        STOP taking these medications    diphenhydrAMINE 25 MG Tabs  Commonly known as:  BENADRYL     doxycycline 100 MG Tabs  Commonly known as:  VIBRAMYCIN                Disposition:   Home    Diet:   Regular    Activity:   Normal    Instructions:         The patient was instructed to return to the ER in the event of worsening symptoms. I have counseled the patient on the importance of compliance and the patient has agreed to proceed with all medical recommendations and follow up plan indicated above.   The patient understands that all medications come with benefits and risks. Risks may include permanent injury or death and these risks can be minimized with close reassessment and monitoring.        Primary Care Provider:      Discharge summary faxed to primary care provider:  Deferred  Copy of discharge summary given to the patient: Deferred      Follow up appointment details :        Camilo Kunz M.D.  [address] [place] [address] [id number]  [id number]  [facility] [address]  [phone]      [facility]  unable to call office to schedule appointment due to weekend.Please call to schedule your appointment for a hospital follow up . Thank you     PCP in 1 week    Pending Studies:        Echo - outpatient imaging order has been   Serology for C. burnetii, West Nile, Lyme, EBV, IgG and IgM for Chikungunya and dengue    Time spent on discharge day patient visit, preparing discharge paperwork and arranging for patient follow up.    Summary of follow up issues:   Follow-up on symptoms  Follow-up on echocardiogram report for murmur  Follow-up on serology results    Discharge Time (Minutes) :    35 minutes  Hospital Course Type:  Inpatient Stay >2 midnights      Condition on Discharge stable  ______________________________________________________________________    Interval history/exam for day of discharge:    No  acute overnight events, feeling at baseline  Rash slightly better than yesterday, clearing up.   No acute medical complains.       Physical Exam   Constitutional:   Sitting comfortably in bed, no acute distress  Cardiovascular: Regular rhythm and intact distal pulses. Exam reveals no gallop and no friction rub.   Murmur (diastolic / de-cresendo systolic murmur over mitral area) heard.  Bradycardic in high 40s-50s  Pulmonary/Chest: Effort normal and breath sounds normal. No respiratory distress. He has no wheezes. He has no rales.   Skin: Rash: Rash is macular, non blanching ,non/tender - generalized, more prominent on bilateral LE, from feet up to the knees involving sole of the foot.     Most Recent Labs:    Lab Results   Component Value Date/Time    WBC 6.0 09/14/2019 03:11 AM    RBC 4.65 (L) 09/14/2019 03:11 AM    HEMOGLOBIN 13.7 (L) 09/14/2019 03:11 AM    HEMATOCRIT 41.6 (L) 09/14/2019 03:11 AM    MCV 89.5 09/14/2019 03:11 AM    MCH 29.5 09/14/2019 03:11 AM    MCHC 32.9 (L) 09/14/2019 03:11 AM    MPV 11.9 09/14/2019 03:11 AM    NEUTSPOLYS 45.50 09/14/2019 03:11 AM    LYMPHOCYTES 38.40 09/14/2019 03:11 AM    MONOCYTES 8.90 09/14/2019 03:11 AM    EOSINOPHILS 3.60 09/14/2019 03:11 AM    BASOPHILS 0.90 09/14/2019 03:11 AM      Lab Results   Component Value Date/Time    SODIUM 138 09/13/2019 03:29 AM    POTASSIUM 4.1 09/13/2019 03:29 AM    CHLORIDE 108 09/13/2019 03:29 AM    CO2 22 09/13/2019 03:29 AM    GLUCOSE 117 (H) 09/13/2019 03:29 AM    BUN 15 09/13/2019 03:29 AM    CREATININE 0.86 09/13/2019 03:29 AM      Lab Results   Component Value Date/Time    ALTSGPT 65 (H) 09/13/2019 03:29 AM    ASTSGOT 90 (H) 09/13/2019 03:29 AM    ALKPHOSPHAT 37 09/13/2019 03:29 AM    TBILIRUBIN 1.1 09/13/2019 03:29 AM    ALBUMIN 3.5 09/13/2019 03:29 AM    GLOBULIN 3.4 09/13/2019 03:29 AM    INR 0.96 09/12/2019 07:35 PM     Lab Results   Component Value Date/Time    PROTHROMBTM 13.0 09/12/2019 07:35 PM    INR 0.96 09/12/2019 07:35 PM

## 2019-09-14 NOTE — CONSULTS
HEMATOLGOY CONSULT NOTE    DATE OF SERVICE: 09/13/2019    REQUESTING MD: Terry Hernandez MD     REASON FOR CONSULT: Sergio Castro is a 44 y.o. male who is being seen in consultation at the request of Dr. Hernandez for an evaluation of thrombocytopenia    CHIEF COMPLAINT: rash    HISTORY OF PRESENT ILLNESS:  43 y/o man with no significant past medical history is being seen in consultation for an evaluation of thrombocytopenia. Pt presented to the ER with a rash which started on Wednesday and worsening over the past 48 hours. Never had anything like this before. He was on vacation in [place] in late August to about 9/1, had mild diarrheal illness but recovered. Then last Thursday/Friday, he went on a hunting trip to [place], he slept outside with no tent on a cot for the night on Friday. By Saturday, he woke up feeling very ill, fatigued, fevers, diarrhea, abdominal pain. This continued for 2 more days so he decided to return home as he was not feeling well. He returned home on Tuesday and was already feeling better. Then on Wednesday, he noticed a red rash appearing on his feet, this slowly worked its way up his legs and arms. Due to this he was seen in the urgent care who gave him doxy and steroids. He was sent for a CBC and due to abnormalities in this was sent to the ER for further evaluation. He is otherwise feeling great today. The rash is not painful or pruritic. He feels it is no longer spreading and it has become less red over the past 12 hours. He has had no bleeding symptoms, no nose bleeds, mouth bleeding, blood in stool or urine. No headaches.      Past Medical History:   Diagnosis Date   • Heart murmur     Mitral Valve Prolapse        Past Surgical History:   Procedure Laterality Date   • SHOULDER ARTHROSCOPY W/ ROTATOR CUFF REPAIR Left 4/6/2016    Procedure: SHOULDER ARTHROSCOPY W/ ROTATOR CUFF REPAIR;  Surgeon: Cayetano Little M.D.;  Location: SURGERY [facility];  Service:    • SHOULDER  DECOMPRESSION ARTHROSCOPIC Left 4/6/2016    Procedure: SHOULDER DECOMPRESSION ARTHROSCOPIC - SUBACROMIAL;  Surgeon: Cayetano Little M.D.;  Location: SURGERY [facility];  Service:    • SHOULDER ARTHROSCOPY W/ BICIPITAL TENODESIS REPAIR Left 4/6/2016    Procedure: SHOULDER ARTHROSCOPY W/ BICIPITAL TENODESIS REPAIR;  Surgeon: Cayetano Little M.D.;  Location: SURGERY [facility];  Service:    • SHOULDER ARTHROSCOPY Right 1990   • HERNIA REPAIR  1981   • OTHER  1979    Undescended testicle        Current Facility-Administered Medications   Medication Dose Route Frequency Provider Last Rate Last Dose   • doxycycline monohydrate (ADOXA) tablet 100 mg  100 mg Oral Q12HRS Leti Mckeon M.D.       • senna-docusate (PERICOLACE or SENOKOT S) 8.6-50 MG per tablet 2 Tab  2 Tab Enteral Tube BID Kartik Omer M.D.        And   • polyethylene glycol/lytes (MIRALAX) PACKET 1 Packet  1 Packet Enteral Tube QDAY PRN Kartik Omer M.D.        And   • magnesium hydroxide (MILK OF MAGNESIA) suspension 30 mL  30 mL Enteral Tube QDAY PRN Kartik Omer M.D.        And   • bisacodyl (DULCOLAX) suppository 10 mg  10 mg Rectal QDAY PRN Kartik Omer M.D.       • acetaminophen (TYLENOL) tablet 650 mg  650 mg Oral Q6HRS PRN Kartik Omer M.D.         Allergies:  Patient has no known allergies.    Social History     Tobacco Use   • Smoking status: Never Smoker   • Smokeless tobacco: Never Used   Substance Use Topics   • Alcohol use: Yes     Frequency: 2-4 times a month     Comment: Occasionally   • Drug use: No       Family History   Problem Relation Age of Onset   • No Known Problems Mother    • No Known Problems Father        Review of Systems:  12 system review was completed and negative except as mentioned in HPI.    PHYSICAL EXAM:  Vitals:    09/13/19 0100 09/13/19 0302 09/13/19 0800 09/13/19 1500   BP: 131/87 133/79 136/84 138/83   Pulse: (!) 47 (!) 46 (!) 44 (!) 48    Resp: 16 16 16 17   Temp: 36.4 °C (97.5 °F) 36.3 °C (97.4 °F) 36.8 °C (98.3 °F) 36.7 °C (98.1 °F)   TempSrc: Temporal Temporal Temporal Temporal   SpO2: 98% 97% 97% 100%   Weight:       Height:           Physical Exam   Constitutional: He is oriented to person, place, and time and well-developed, well-nourished, and in no distress.   HENT:   Head: Normocephalic and atraumatic.   Right Ear: External ear normal.   Left Ear: External ear normal.   Nose: Nose normal.   Mouth/Throat: Oropharynx is clear and moist. No oropharyngeal exudate.   Sores on his lips   Eyes: Pupils are equal, round, and reactive to light. Conjunctivae are normal. No scleral icterus.   Neck: Neck supple.   Cardiovascular: Regular rhythm. Bradycardia present. Exam reveals no gallop and no friction rub.   No murmur heard.  Pulmonary/Chest: Effort normal and breath sounds normal. No respiratory distress. He has no wheezes. He has no rales. He exhibits no tenderness.   Abdominal: Soft. Bowel sounds are normal. He exhibits no distension. There is no tenderness. There is no rebound.   Musculoskeletal: Normal range of motion. He exhibits no edema or tenderness.   Lymphadenopathy:     He has no cervical adenopathy.   Neurological: He is alert and oriented to person, place, and time.   Skin: Skin is warm and dry.   colleasing macular erythematous rash on his legs extending from his feet to his hips, similar appearing on his hands to his mid arms. Non-blanching       LABORATORY:  Recent Labs     09/12/19  1202 09/12/19  1935 09/13/19  0329   WBC 4.6* 3.1* 3.9*   RBC 5.00 4.49* 4.45*   HEMOGLOBIN 14.7 13.6* 13.3*   HEMATOCRIT 43.0 39.2* 38.7*   MCV 86.0 87.3 87.0   MCH 29.4 30.3 29.9   RDW 39.9 40.5 40.7   PLATELETCT 43* 48* 51*   MPV 10.9 12.5 11.8   NEUTSPOLYS 38.00* 55.00 48.70   LYMPHOCYTES 55.00* 30.00 38.90   MONOCYTES 4.00 6.00 4.40   EOSINOPHILS 0.00 0.00 0.00   BASOPHILS 0.00 1.00 0.90   RBCMORPHOLO Normal Normal Present       Recent Labs      09/12/19  1202 09/12/19 1935 09/13/19 0329   SODIUM 138 137 138   POTASSIUM 3.9 3.8 4.1   CHLORIDE 102 108 108   CO2 23 20 22   GLUCOSE 98 140* 117*   BUN 11 17 15       Recent Labs     09/12/19  1202 09/12/19 1935 09/13/19 0329   ASTSGOT 145* 113* 90*   ALTSGPT 85* 74* 65*   TBILIRUBIN 1.4 1.1 1.1   ALKPHOSPHAT 49 41 37   GLOBULIN 3.6* 3.4 3.4   INR  --  0.96  --        Peripheral smear review:  Reactive lymphocytes are present, otherwise normal morphology to WBCs  No shistocytes, some poikilocytosis of the RBCs but mild  Moderate thrombocytopenia, no clumping    ASSESSMENT AND PLAN:  43 y/o man with no significant past medical history is being seen in consultation for an evaluation of thrombocytopenia. He had what seems to be a viral illness after camping in [place] recently, his symptoms of fever, fatigue, diarrhea, abdominal pain have since resolved, but on Wednesday noticed a rash that has spread diffusely throughout his extremities. It is not painful or puritic. No fevers or MS changes.    1. Thrombocytopenia - no history of low platelets in the past. Platelets 43, then 49, then 51. No new medication recently, Coags and fibrinogen are normal so unlikely DIC. No shistocytes on smear, normal renal function so unlikley TTP/HUS picture. Abdominal ultrasound showed no splenomegaly. Hepatitis panel/HIV negative so not due to a chronic viral infection. His thrombocytopenia is most likely secondary to an acute infection - vector borne illness such as RMSF given his rash? As RMSF  progresses it is not uncommon to see thrombocytopenia  He is being treated per ID.    2. Mild Leukopenia, normal ANC, mild/normocytic anemia - again feel this is most likely secondary to underlying illness. Given his rash and symptoms, I feel he most likley has a tick-borne illness such as RMSF. Will need to monitor his counts closely for improvement as his underlying illness is treated.     I will also check a vitamin b12, folate, and  recommend monitoring fibrinogen and coags.    Patient is has a high medical complexity and is at high risk for complication, morbidity, and mortality.    Thank you for this consultation, will continue to follow the patient. If you have any questions or concerns, please contact me.    Rose Mckoy MD  [facility]  [phone]

## 2019-09-14 NOTE — CARE PLAN
Problem: Discharge Barriers/Planning  Goal: Patient's continuum of care needs will be met  Outcome: PROGRESSING AS EXPECTED  Intervention: Explain discharge instructions and medication reconcilliation to patient and significant other/support system  Note:   Discuss discharge instructions, medications and follow up appointments

## 2019-09-15 LAB
R TYPHI IGG TITR SER IF: NORMAL {TITER}
R TYPHI IGM TITR SER IF: NORMAL {TITER}

## 2019-09-16 LAB
B BURGDOR DNA SPEC QL NAA+PROBE: NOT DETECTED
LYME SOURCE Q4169: NORMAL

## 2019-09-17 ENCOUNTER — HOSPITAL ENCOUNTER (OUTPATIENT)
Dept: LAB | Facility: MEDICAL CENTER | Age: 44
End: 2019-09-17
Attending: STUDENT IN AN ORGANIZED HEALTH CARE EDUCATION/TRAINING PROGRAM
Payer: COMMERCIAL

## 2019-09-17 DIAGNOSIS — D69.6 THROMBOCYTOPENIA (HCC): ICD-10-CM

## 2019-09-17 DIAGNOSIS — D61.818 PANCYTOPENIA, ACQUIRED (HCC): ICD-10-CM

## 2019-09-17 LAB
BACTERIA BLD CULT: NORMAL
BACTERIA BLD CULT: NORMAL
BASOPHILS # BLD AUTO: 0 % (ref 0–1.8)
BASOPHILS # BLD: 0 K/UL (ref 0–0.12)
C BURNET PH1 IGG SER QL IF: NEGATIVE
C BURNET PH2 IGG SER QL IF: NEGATIVE
EBV EA-D IGG SER-ACNC: 56.5 U/ML (ref 0–10.9)
EBV NA IGG SER IA-ACNC: >600 U/ML (ref 0–21.9)
EBV VCA IGG SER IA-ACNC: 282 U/ML (ref 0–21.9)
EBV VCA IGM SER IA-ACNC: <10 U/ML (ref 0–43.9)
EOSINOPHIL # BLD AUTO: 0.05 K/UL (ref 0–0.51)
EOSINOPHIL NFR BLD: 0.9 % (ref 0–6.9)
ERYTHROCYTE [DISTWIDTH] IN BLOOD BY AUTOMATED COUNT: 42.8 FL (ref 35.9–50)
GIANT PLATELETS BLD QL SMEAR: NORMAL
HCT VFR BLD AUTO: 43.2 % (ref 42–52)
HGB BLD-MCNC: 14.3 G/DL (ref 14–18)
LYMPHOCYTES # BLD AUTO: 2.12 K/UL (ref 1–4.8)
LYMPHOCYTES NFR BLD: 34.8 % (ref 22–41)
MANUAL DIFF BLD: NORMAL
MCH RBC QN AUTO: 29.8 PG (ref 27–33)
MCHC RBC AUTO-ENTMCNC: 33.1 G/DL (ref 33.7–35.3)
MCV RBC AUTO: 90 FL (ref 81.4–97.8)
MONOCYTES # BLD AUTO: 1.06 K/UL (ref 0–0.85)
MONOCYTES NFR BLD AUTO: 17.4 % (ref 0–13.4)
MORPHOLOGY BLD-IMP: NORMAL
NEUTROPHILS # BLD AUTO: 2.86 K/UL (ref 1.82–7.42)
NEUTROPHILS NFR BLD: 46.9 % (ref 44–72)
NRBC # BLD AUTO: 0 K/UL
NRBC BLD-RTO: 0 /100 WBC
PLATELET # BLD AUTO: 236 K/UL (ref 164–446)
PLATELET BLD QL SMEAR: NORMAL
PMV BLD AUTO: 11.2 FL (ref 9–12.9)
RBC # BLD AUTO: 4.8 M/UL (ref 4.7–6.1)
RBC BLD AUTO: PRESENT
SIGNIFICANT IND 70042: NORMAL
SIGNIFICANT IND 70042: NORMAL
SITE SITE: NORMAL
SITE SITE: NORMAL
SOURCE SOURCE: NORMAL
SOURCE SOURCE: NORMAL
VARIANT LYMPHS BLD QL SMEAR: NORMAL
WBC # BLD AUTO: 6.1 K/UL (ref 4.8–10.8)

## 2019-09-17 PROCEDURE — 36415 COLL VENOUS BLD VENIPUNCTURE: CPT

## 2019-09-17 PROCEDURE — 85027 COMPLETE CBC AUTOMATED: CPT

## 2019-09-17 PROCEDURE — 85007 BL SMEAR W/DIFF WBC COUNT: CPT

## 2019-09-19 LAB — TEST NAME 95000: NORMAL

## 2019-09-20 LAB — TEST NAME 95000: ABNORMAL

## 2019-09-23 ENCOUNTER — OFFICE VISIT (OUTPATIENT)
Dept: MEDICAL GROUP | Facility: MEDICAL CENTER | Age: 44
End: 2019-09-23
Payer: COMMERCIAL

## 2019-09-23 VITALS
RESPIRATION RATE: 16 BRPM | HEART RATE: 67 BPM | OXYGEN SATURATION: 96 % | TEMPERATURE: 98.3 F | DIASTOLIC BLOOD PRESSURE: 72 MMHG | HEIGHT: 74 IN | SYSTOLIC BLOOD PRESSURE: 128 MMHG | WEIGHT: 184.4 LBS | BODY MASS INDEX: 23.66 KG/M2

## 2019-09-23 DIAGNOSIS — D69.6 THROMBOCYTOPENIA (HCC): ICD-10-CM

## 2019-09-23 DIAGNOSIS — D61.818 PANCYTOPENIA, ACQUIRED (HCC): ICD-10-CM

## 2019-09-23 DIAGNOSIS — R23.3 PETECHIAL RASH: ICD-10-CM

## 2019-09-23 DIAGNOSIS — R01.1 MURMUR: ICD-10-CM

## 2019-09-23 PROCEDURE — 99214 OFFICE O/P EST MOD 30 MIN: CPT | Performed by: FAMILY MEDICINE

## 2019-09-23 NOTE — ASSESSMENT & PLAN NOTE
At the last visit, the patient had a murmur.  I sent him for an echocardiogram but he did not get this done.  This murmur was also noted in the hospital.

## 2019-09-23 NOTE — PROGRESS NOTES
[facility]  Progress Note  Established Patient    Subjective:   Sergio Castro is a 44 y.o. male here today with a chief complaint of rash. The patient is alone.     Pancytopenia, acquired (HCC)  Noted in the hospital, recently improved.    Thrombocytopenia (HCC)  Noted in the hospital, recently improved.     Murmur  At the last visit, the patient had a murmur.  I sent him for an echocardiogram but he did not get this done.  This murmur was also noted in the hospital.    Petechial rash  The patient was admitted to the hospital on 9/12/2019 for a petechial rash.  Several weeks prior to this, he had traveled to both [place] and [place].  While in [place], he was bitten by a number of bugs.  After returning from [place], he subsequently developed stomach pain, fever, diarrhea and a petechial rash.  He had pancytopenia and transaminitis. While in the hospital, he was treated supportively and with doxycycline. His lab values improved, as did his symptoms.  He has finished his doxycycline.  Today, he states that he is feeling very well.  He denies fever, fatigue, rash, chest pain, shortness of breath.  Patient was instructed to follow-up with ID outpatient.  He tried to make an appointment but had some difficulty so he decided to hold off for now.  He had extensive lab testing which suggest possible dengue fever.  He gives us verbal and written permission to inform the health department of a possible dengue fever diagnosis.       No current outpatient medications on file prior to visit.     No current facility-administered medications on file prior to visit.        Past Medical History:   Diagnosis Date   • Heart murmur     Mitral Valve Prolapse       Allergies: Patient has no known allergies.    Surgical History:  has a past surgical history that includes shoulder arthroscopy (Right, 1990); other (1979); shoulder arthroscopy w/ rotator cuff repair (Left, 4/6/2016); shoulder decompression arthroscopic (Left,  "4/6/2016); shoulder arthroscopy w/ bicipital tenodesis repair (Left, 4/6/2016); and hernia repair (1981).    Family History: family history includes No Known Problems in his father and mother.    Social History:  reports that he has never smoked. He has never used smokeless tobacco. He reports that he drinks alcohol. He reports that he does not use drugs.    ROS: no fever or nausea.        Objective:     Vitals:    09/23/19 1507   BP: 128/72   BP Location: Left arm   Patient Position: Sitting   BP Cuff Size: Large adult   Pulse: 67   Resp: 16   Temp: 36.8 °C (98.3 °F)   TempSrc: Temporal   SpO2: 96%   Weight: 83.6 kg (184 lb 6.4 oz)   Height: 1.88 m (6' 2\")       Physical Exam:  General: alert in no apparent distress.   Cardio: mid systolic click.   Resp: CTAB no w/r/r.   Ext: no rash.         Assessment and Plan:     1. Murmur  - EC-ECHOCARDIOGRAM COMPLETE W/O CONT; Future    2. Petechial rash  Suspect caused by Dengue fever, now appears resolved. Will repeat labs next week. Staff message out to Dr. Kunz (ID) to ensure no futher w/u or treatment is required. Advised patient to let me know with any new or worsening symptoms. Will also report to health dept.   - CBC WITH DIFFERENTIAL; Future  - [facility]; Future  - Comp Metabolic Panel; Future    3. Pancytopenia, acquired (HCC)  - CBC WITH DIFFERENTIAL; Future    4. Thrombocytopenia (HCC)  - [facility]; Future  - Comp Metabolic Panel; Future        Followup: Return if symptoms worsen or fail to improve.         "

## 2019-09-23 NOTE — ASSESSMENT & PLAN NOTE
The patient was admitted to the hospital on 9/12/2019 for a petechial rash.  Several weeks prior to this, he had traveled to both [place] and [place].  While in [place], he was bitten by a number of bugs.  After returning from [place], he subsequently developed stomach pain, fever, diarrhea and a petechial rash.  He had pancytopenia and transaminitis. While in the hospital, he was treated supportively and with doxycycline. His lab values improved, as did his symptoms.  He has finished his doxycycline.  Today, he states that he is feeling very well.  He denies fever, fatigue, rash, chest pain, shortness of breath.  Patient was instructed to follow-up with ID outpatient.  He tried to make an appointment but had some difficulty so he decided to hold off for now.  He had extensive lab testing which suggest possible dengue fever.  He gives us verbal and written permission to inform the health department of a possible dengue fever diagnosis.

## 2019-10-01 LAB
SIN NOMBRE VIRUS 93385: NORMAL
SIN NOMBRE VIRUS IGM 93386: NORMAL

## 2019-10-02 ENCOUNTER — HOSPITAL ENCOUNTER (OUTPATIENT)
Dept: LAB | Facility: MEDICAL CENTER | Age: 44
End: 2019-10-02
Attending: FAMILY MEDICINE
Payer: COMMERCIAL

## 2019-10-02 ENCOUNTER — HOSPITAL ENCOUNTER (OUTPATIENT)
Dept: CARDIOLOGY | Facility: MEDICAL CENTER | Age: 44
End: 2019-10-02
Attending: FAMILY MEDICINE
Payer: COMMERCIAL

## 2019-10-02 DIAGNOSIS — R01.1 MURMUR: ICD-10-CM

## 2019-10-02 DIAGNOSIS — D69.6 THROMBOCYTOPENIA (HCC): ICD-10-CM

## 2019-10-02 DIAGNOSIS — Z00.00 HEALTHCARE MAINTENANCE: ICD-10-CM

## 2019-10-02 DIAGNOSIS — R23.3 PETECHIAL RASH: ICD-10-CM

## 2019-10-02 DIAGNOSIS — D61.818 PANCYTOPENIA, ACQUIRED (HCC): ICD-10-CM

## 2019-10-02 LAB
ALBUMIN SERPL BCP-MCNC: 4.3 G/DL (ref 3.2–4.9)
ALBUMIN/GLOB SERPL: 1.3 G/DL
ALP SERPL-CCNC: 49 U/L (ref 30–99)
ALT SERPL-CCNC: 33 U/L (ref 2–50)
ANION GAP SERPL CALC-SCNC: 6 MMOL/L (ref 0–11.9)
AST SERPL-CCNC: 32 U/L (ref 12–45)
BASOPHILS # BLD AUTO: 0.4 % (ref 0–1.8)
BASOPHILS # BLD: 0.02 K/UL (ref 0–0.12)
BILIRUB SERPL-MCNC: 1.2 MG/DL (ref 0.1–1.5)
BUN SERPL-MCNC: 16 MG/DL (ref 8–22)
CALCIUM SERPL-MCNC: 10 MG/DL (ref 8.5–10.5)
CHLORIDE SERPL-SCNC: 106 MMOL/L (ref 96–112)
CHOLEST SERPL-MCNC: 195 MG/DL (ref 100–199)
CO2 SERPL-SCNC: 24 MMOL/L (ref 20–33)
CREAT SERPL-MCNC: 1 MG/DL (ref 0.5–1.4)
EOSINOPHIL # BLD AUTO: 0.13 K/UL (ref 0–0.51)
EOSINOPHIL NFR BLD: 2.6 % (ref 0–6.9)
ERYTHROCYTE [DISTWIDTH] IN BLOOD BY AUTOMATED COUNT: 47.2 FL (ref 35.9–50)
GLOBULIN SER CALC-MCNC: 3.2 G/DL (ref 1.9–3.5)
GLUCOSE SERPL-MCNC: 94 MG/DL (ref 65–99)
HCT VFR BLD AUTO: 42.6 % (ref 42–52)
HDLC SERPL-MCNC: 64 MG/DL
HGB BLD-MCNC: 13.8 G/DL (ref 14–18)
IMM GRANULOCYTES # BLD AUTO: 0.01 K/UL (ref 0–0.11)
IMM GRANULOCYTES NFR BLD AUTO: 0.2 % (ref 0–0.9)
LDH SERPL L TO P-CCNC: 219 U/L (ref 107–266)
LDLC SERPL CALC-MCNC: 115 MG/DL
LV EJECT FRACT  99904: 60
LV EJECT FRACT MOD 2C 99903: 56.25
LV EJECT FRACT MOD 4C 99902: 68.24
LV EJECT FRACT MOD BP 99901: 60.49
LYMPHOCYTES # BLD AUTO: 1.61 K/UL (ref 1–4.8)
LYMPHOCYTES NFR BLD: 32.5 % (ref 22–41)
MCH RBC QN AUTO: 30.1 PG (ref 27–33)
MCHC RBC AUTO-ENTMCNC: 32.4 G/DL (ref 33.7–35.3)
MCV RBC AUTO: 93 FL (ref 81.4–97.8)
MONOCYTES # BLD AUTO: 0.63 K/UL (ref 0–0.85)
MONOCYTES NFR BLD AUTO: 12.7 % (ref 0–13.4)
NEUTROPHILS # BLD AUTO: 2.55 K/UL (ref 1.82–7.42)
NEUTROPHILS NFR BLD: 51.6 % (ref 44–72)
NRBC # BLD AUTO: 0 K/UL
NRBC BLD-RTO: 0 /100 WBC
PLATELET # BLD AUTO: 193 K/UL (ref 164–446)
PMV BLD AUTO: 10.7 FL (ref 9–12.9)
POTASSIUM SERPL-SCNC: 4.5 MMOL/L (ref 3.6–5.5)
PROT SERPL-MCNC: 7.5 G/DL (ref 6–8.2)
RBC # BLD AUTO: 4.58 M/UL (ref 4.7–6.1)
SODIUM SERPL-SCNC: 136 MMOL/L (ref 135–145)
TRIGL SERPL-MCNC: 79 MG/DL (ref 0–149)
WBC # BLD AUTO: 5 K/UL (ref 4.8–10.8)

## 2019-10-02 PROCEDURE — 93306 TTE W/DOPPLER COMPLETE: CPT | Mod: 26 | Performed by: INTERNAL MEDICINE

## 2019-10-02 PROCEDURE — 80053 COMPREHEN METABOLIC PANEL: CPT

## 2019-10-02 PROCEDURE — 82728 ASSAY OF FERRITIN: CPT

## 2019-10-02 PROCEDURE — 83550 IRON BINDING TEST: CPT

## 2019-10-02 PROCEDURE — 80061 LIPID PANEL: CPT

## 2019-10-02 PROCEDURE — 82746 ASSAY OF FOLIC ACID SERUM: CPT

## 2019-10-02 PROCEDURE — 36415 COLL VENOUS BLD VENIPUNCTURE: CPT

## 2019-10-02 PROCEDURE — 83615 LACTATE (LD) (LDH) ENZYME: CPT

## 2019-10-02 PROCEDURE — 93306 TTE W/DOPPLER COMPLETE: CPT

## 2019-10-02 PROCEDURE — 83540 ASSAY OF IRON: CPT

## 2019-10-02 PROCEDURE — 85025 COMPLETE CBC W/AUTO DIFF WBC: CPT

## 2019-10-03 ENCOUNTER — TELEPHONE (OUTPATIENT)
Dept: MEDICAL GROUP | Facility: MEDICAL CENTER | Age: 44
End: 2019-10-03

## 2019-10-03 DIAGNOSIS — D64.9 ANEMIA, UNSPECIFIED TYPE: ICD-10-CM

## 2019-10-03 DIAGNOSIS — R01.1 MURMUR: ICD-10-CM

## 2019-10-03 LAB
FERRITIN SERPL-MCNC: 358 NG/ML (ref 22–322)
FOLATE SERPL-MCNC: 16.6 NG/ML
IRON SATN MFR SERPL: 28 % (ref 15–55)
IRON SERPL-MCNC: 82 UG/DL (ref 50–180)
TIBC SERPL-MCNC: 294 UG/DL (ref 250–450)

## 2019-10-03 NOTE — TELEPHONE ENCOUNTER
Called the lab they stated they were able to add on all the tests minus the B12 due to light sensitivity. Please advise....

## 2019-10-03 NOTE — TELEPHONE ENCOUNTER
----- Message from Solitario Michael M.D. sent at 10/3/2019  9:58 AM PDT -----  Can someone call the lab and ask them to add on folic acid, B12, ferritin and iron testing to labs done 10/2. Orders placed.

## 2019-10-04 DIAGNOSIS — D64.9 ANEMIA, UNSPECIFIED TYPE: ICD-10-CM

## 2019-10-10 ENCOUNTER — TELEPHONE (OUTPATIENT)
Dept: MEDICAL GROUP | Facility: MEDICAL CENTER | Age: 44
End: 2019-10-10

## 2019-10-10 NOTE — TELEPHONE ENCOUNTER
Pt returned the call and I informed reg Dr. Michael's message plus provided pt the information to Cardiology. Pt.will contact Cardiology for an appointment.   Dr. Michael, blessing SANCHEZ....

## 2019-10-10 NOTE — TELEPHONE ENCOUNTER
Phone Number Called: [phone] (home)     Call outcome: left message for patient to call back regarding message below    Message: left message asking pt. To check my chart or to give us a call back for his test results.

## 2019-10-10 NOTE — TELEPHONE ENCOUNTER
----- Message from Sergio Castro sent at 10/10/2019  5:01 AM PDT -----  Regarding: Echo  Hi Mr. Castro,     Your echo confirms a mitral valve prolapse, which I believe you are aware of.     At some point we should have you establish with a cardiologist for intermittent monitoring. I have placed this referral.     Take care,   Solitario Michael M.D.

## 2019-10-16 ENCOUNTER — TELEPHONE (OUTPATIENT)
Dept: MEDICAL GROUP | Facility: MEDICAL CENTER | Age: 44
End: 2019-10-16

## 2019-10-16 NOTE — TELEPHONE ENCOUNTER
Phone Number Called: [phone] (home)     Call outcome: left message for patient to call back regarding message below    Message: Or to check [employer] message regarding results.

## 2019-10-16 NOTE — TELEPHONE ENCOUNTER
----- Message from Solitario Michael M.D. sent at 10/11/2019  7:37 AM PDT -----  Regarding: FW: Labs      ----- Message -----  From: Solitario Michael M.D.  Sent: 10/4/2019  To: Sergio Castro  Subject: Labs                                             Hi Mr. Castro,     Your labs look good overall. You have a very slight anemia so we should recheck labs in 2 months. I have placed the orders. These are non-fasting labs.     Please don't hesitate to call or write with any concerns.   Solitario Michael M.D.

## 2020-02-27 ENCOUNTER — APPOINTMENT (RX ONLY)
Dept: URBAN - METROPOLITAN AREA CLINIC 4 | Facility: CLINIC | Age: 45
Setting detail: DERMATOLOGY
End: 2020-02-27

## 2020-02-27 DIAGNOSIS — L57.8 OTHER SKIN CHANGES DUE TO CHRONIC EXPOSURE TO NONIONIZING RADIATION: ICD-10-CM

## 2020-02-27 DIAGNOSIS — L72.0 EPIDERMAL CYST: ICD-10-CM

## 2020-02-27 PROCEDURE — ? ADDITIONAL NOTES

## 2020-02-27 PROCEDURE — ? COUNSELING

## 2020-02-27 PROCEDURE — 99202 OFFICE O/P NEW SF 15 MIN: CPT

## 2020-02-27 ASSESSMENT — LOCATION SIMPLE DESCRIPTION DERM
LOCATION SIMPLE: RIGHT INFERIOR EYELID
LOCATION SIMPLE: LEFT FOREHEAD

## 2020-02-27 ASSESSMENT — LOCATION ZONE DERM
LOCATION ZONE: FACE
LOCATION ZONE: EYELID

## 2020-02-27 ASSESSMENT — LOCATION DETAILED DESCRIPTION DERM
LOCATION DETAILED: RIGHT LATERAL INFERIOR EYELID
LOCATION DETAILED: LEFT SUPERIOR FOREHEAD

## 2020-02-27 NOTE — PROCEDURE: ADDITIONAL NOTES
Detail Level: Simple
Additional Notes: Includes spot of concern mentioned on intake.\\nReassure and observe for changes.

## 2024-08-07 ENCOUNTER — TELEPHONE (OUTPATIENT)
Dept: CARDIOLOGY | Facility: MEDICAL CENTER | Age: 49
End: 2024-08-07
Payer: COMMERCIAL

## 2024-08-23 ENCOUNTER — OFFICE VISIT (OUTPATIENT)
Dept: CARDIOLOGY | Facility: MEDICAL CENTER | Age: 49
End: 2024-08-23
Attending: INTERNAL MEDICINE
Payer: COMMERCIAL

## 2024-08-23 VITALS
DIASTOLIC BLOOD PRESSURE: 68 MMHG | WEIGHT: 186 LBS | HEART RATE: 53 BPM | HEIGHT: 73 IN | RESPIRATION RATE: 16 BRPM | SYSTOLIC BLOOD PRESSURE: 120 MMHG | OXYGEN SATURATION: 98 % | BODY MASS INDEX: 24.65 KG/M2

## 2024-08-23 DIAGNOSIS — I34.0 NONRHEUMATIC MITRAL VALVE REGURGITATION: ICD-10-CM

## 2024-08-23 DIAGNOSIS — R00.2 PALPITATIONS: ICD-10-CM

## 2024-08-23 DIAGNOSIS — I34.1 MITRAL VALVE PROLAPSE: ICD-10-CM

## 2024-08-23 LAB — EKG IMPRESSION: NORMAL

## 2024-08-23 PROCEDURE — 99202 OFFICE O/P NEW SF 15 MIN: CPT | Performed by: INTERNAL MEDICINE

## 2024-08-23 PROCEDURE — 93005 ELECTROCARDIOGRAM TRACING: CPT | Performed by: INTERNAL MEDICINE

## 2024-08-24 NOTE — PROGRESS NOTES
"CARDIOLOGY OUTPATIENT FOLLOWUP    PCP: None    1. Mitral valve prolapse    2. Nonrheumatic mitral valve regurgitation    3. Palpitations        Sergio Castro has resolved palpitations but the cardiovascular exam (palpable S3, enlarged PMI) suggests a significant amount of regurgitation due to prolapse.  I advised updating a laboratory profile including a BNP as well as an echocardiogram.    Follow up: 1 year    History: Sergio Castro is a 49 y.o. male with history of mitral valve prolapse presenting for assessment of palpitations.    1 month ago he had forceful palpitations which occurred very frequently over 2 to 3-day..  These eventually subsided.  The felt palpitations were intermittent.  He adjusted caffeine intake.  He feels back to his normal self.    2019 he had an echocardiogram showing bileaflet prolapse, moderate mitral regurgitation.    He feels well.  Exercises regularly.  [employer]ks.  Has 2 kids: 6th & 4th grade.  His dad had heart valve surgery ended up dying of what sounds to be endocarditis which is the Sergio very reluctant to put any prosthetic material in his heart.      Physical Exam:  /68 (BP Location: Left arm, Patient Position: Sitting, BP Cuff Size: Adult)   Pulse (!) 53   Resp 16   Ht 1.854 m (6' 1\")   Wt 84.4 kg (186 lb)   SpO2 98%   BMI 24.54 kg/m²   GEN: NAD  CARDIAC: Regular. Normal S1, inaudible S2- obscured by blowing late systolic murmur at the apex. Early diastolic decrescendo murmur. PMI is non-displaced but enlarged and with a palpable S3  VASCULATURE: Normal carotid upstroke  RESP: Clear to auscultation bilaterally  ABD: Soft, non-tender, non-distended  EXT: No edema  NEURO: No focal deficit    Today's encounter addressed an illness with threat to life/bodily function Significant mitral regurgitation due to mitral valve prolapse. Spent much time discussing natural history of mitral regurgitation and also possible acute severe MR - discussed " patients preferences around not having cardiac surgery due to his fathers complications from endocarditis.   () Today's E/M visit is associated with medical care services that serve as the continuing focal point for all needed health care services and/or with medical care services that  are part of ongoing care related to a patient's single, serious condition, or a complex condition: This includes  furnishing services to patients on an ongoing basis that result in care that is personalized  to the patient. The services result in a comprehensive, longitudinal, and continuous  relationship with the patient and involve delivery of team-based care that is accessible, coordinated with other practitioners and providers, and integrated with the broader health  care landscape.     The ASCVD Risk score ([place] HENRY, et al., 2019) failed to calculate.    Studies  Lab Results   Component Value Date/Time    CHOLSTRLTOT 195 10/02/2019 09:20 AM     (H) 10/02/2019 09:20 AM    HDL 64 10/02/2019 09:20 AM    TRIGLYCERIDE 79 10/02/2019 09:20 AM       Lab Results   Component Value Date/Time    SODIUM 136 10/02/2019 09:20 AM    POTASSIUM 4.5 10/02/2019 09:20 AM    CHLORIDE 106 10/02/2019 09:20 AM    CO2 24 10/02/2019 09:20 AM    GLUCOSE 94 10/02/2019 09:20 AM    BUN 16 10/02/2019 09:20 AM    CREATININE 1.00 10/02/2019 09:20 AM      Lab Results   Component Value Date/Time    PROTHROMBTM 13.0 09/12/2019 07:35 PM    INR 0.96 09/12/2019 07:35 PM      Lab Results   Component Value Date/Time    WBC 5.0 10/02/2019 09:20 AM    RBC 4.58 (L) 10/02/2019 09:20 AM    HEMOGLOBIN 13.8 (L) 10/02/2019 09:20 AM    HEMATOCRIT 42.6 10/02/2019 09:20 AM    MCV 93.0 10/02/2019 09:20 AM    MCH 30.1 10/02/2019 09:20 AM    MCHC 32.4 (L) 10/02/2019 09:20 AM    MPV 10.7 10/02/2019 09:20 AM    NEUTSPOLYS 51.60 10/02/2019 09:20 AM    LYMPHOCYTES 32.50 10/02/2019 09:20 AM    MONOCYTES 12.70 10/02/2019 09:20 AM    EOSINOPHILS 2.60 10/02/2019 09:20 AM     BASOPHILS 0.40 10/02/2019 09:20 AM        Past Medical History:   Diagnosis Date    Heart murmur     Mitral Valve Prolapse     No Known Allergies  No outpatient encounter medications on file as of 8/23/2024.     No facility-administered encounter medications on file as of 8/23/2024.     Social History     Socioeconomic History    Marital status:      Spouse name: Not on file    Number of children: Not on file    Years of education: Not on file    Highest education level: Not on file   Occupational History    Not on file   Tobacco Use    Smoking status: Never    Smokeless tobacco: Never   Vaping Use    Vaping status: Never Used   Substance and Sexual Activity    Alcohol use: Yes     Comment: Occasionally    Drug use: No    Sexual activity: Not on file   Other Topics Concern    Not on file   Social History Narrative    Not on file     Social Determinants of Health     Financial Resource Strain: Not on file   Food Insecurity: Not on file   Transportation Needs: Not on file   Physical Activity: Not on file   Stress: Not on file   Social Connections: Not on file   Intimate Partner Violence: Not on file   Housing Stability: Not on file         ROS:   10 point review systems is otherwise negative except as per the HPI    Chief Complaint   Patient presents with    New Patient     NP Dx: Murmur

## 2024-09-18 ENCOUNTER — HOSPITAL ENCOUNTER (OUTPATIENT)
Dept: LAB | Facility: MEDICAL CENTER | Age: 49
End: 2024-09-18
Attending: INTERNAL MEDICINE
Payer: COMMERCIAL

## 2024-09-18 DIAGNOSIS — I34.1 MITRAL VALVE PROLAPSE: ICD-10-CM

## 2024-09-18 LAB — NT-PROBNP SERPL IA-MCNC: 216 PG/ML (ref 0–125)

## 2024-09-18 PROCEDURE — 36415 COLL VENOUS BLD VENIPUNCTURE: CPT

## 2024-09-18 PROCEDURE — 83880 ASSAY OF NATRIURETIC PEPTIDE: CPT

## 2024-09-20 ENCOUNTER — HOSPITAL ENCOUNTER (OUTPATIENT)
Dept: CARDIOLOGY | Facility: MEDICAL CENTER | Age: 49
End: 2024-09-20
Attending: INTERNAL MEDICINE
Payer: COMMERCIAL

## 2024-09-20 DIAGNOSIS — I34.1 MITRAL VALVE PROLAPSE: ICD-10-CM

## 2024-09-20 LAB
LV EJECT FRACT  99904: 75
LV EJECT FRACT MOD 4C 99902: 75.65

## 2024-09-20 PROCEDURE — 93306 TTE W/DOPPLER COMPLETE: CPT | Mod: 26 | Performed by: INTERNAL MEDICINE

## 2024-09-20 PROCEDURE — 93306 TTE W/DOPPLER COMPLETE: CPT

## 2024-10-14 ENCOUNTER — APPOINTMENT (OUTPATIENT)
Dept: LAB | Facility: MEDICAL CENTER | Age: 49
End: 2024-10-14
Payer: COMMERCIAL

## 2024-10-21 ENCOUNTER — HOSPITAL ENCOUNTER (OUTPATIENT)
Dept: LAB | Facility: MEDICAL CENTER | Age: 49
End: 2024-10-21
Attending: INTERNAL MEDICINE
Payer: COMMERCIAL

## 2024-10-21 DIAGNOSIS — I34.1 MITRAL VALVE PROLAPSE: ICD-10-CM

## 2024-10-21 LAB
ALBUMIN SERPL BCP-MCNC: 4.4 G/DL (ref 3.2–4.9)
ALBUMIN/GLOB SERPL: 1.4 G/DL
ALP SERPL-CCNC: 66 U/L (ref 30–99)
ALT SERPL-CCNC: 20 U/L (ref 2–50)
ANION GAP SERPL CALC-SCNC: 11 MMOL/L (ref 7–16)
AST SERPL-CCNC: 23 U/L (ref 12–45)
BILIRUB SERPL-MCNC: 1.1 MG/DL (ref 0.1–1.5)
BUN SERPL-MCNC: 17 MG/DL (ref 8–22)
CALCIUM ALBUM COR SERPL-MCNC: 10.1 MG/DL (ref 8.5–10.5)
CALCIUM SERPL-MCNC: 10.4 MG/DL (ref 8.5–10.5)
CHLORIDE SERPL-SCNC: 106 MMOL/L (ref 96–112)
CHOLEST SERPL-MCNC: 197 MG/DL (ref 100–199)
CO2 SERPL-SCNC: 23 MMOL/L (ref 20–33)
CREAT SERPL-MCNC: 1.04 MG/DL (ref 0.5–1.4)
ERYTHROCYTE [DISTWIDTH] IN BLOOD BY AUTOMATED COUNT: 46.5 FL (ref 35.9–50)
FASTING STATUS PATIENT QL REPORTED: NORMAL
GFR SERPLBLD CREATININE-BSD FMLA CKD-EPI: 87 ML/MIN/1.73 M 2
GLOBULIN SER CALC-MCNC: 3.1 G/DL (ref 1.9–3.5)
GLUCOSE SERPL-MCNC: 91 MG/DL (ref 65–99)
HCT VFR BLD AUTO: 45.2 % (ref 42–52)
HDLC SERPL-MCNC: 63 MG/DL
HGB BLD-MCNC: 15.2 G/DL (ref 14–18)
LDLC SERPL CALC-MCNC: 118 MG/DL
MCH RBC QN AUTO: 30.8 PG (ref 27–33)
MCHC RBC AUTO-ENTMCNC: 33.6 G/DL (ref 32.3–36.5)
MCV RBC AUTO: 91.7 FL (ref 81.4–97.8)
PLATELET # BLD AUTO: 219 K/UL (ref 164–446)
PMV BLD AUTO: 11.4 FL (ref 9–12.9)
POTASSIUM SERPL-SCNC: 4.8 MMOL/L (ref 3.6–5.5)
PROT SERPL-MCNC: 7.5 G/DL (ref 6–8.2)
RBC # BLD AUTO: 4.93 M/UL (ref 4.7–6.1)
SODIUM SERPL-SCNC: 140 MMOL/L (ref 135–145)
TRIGL SERPL-MCNC: 81 MG/DL (ref 0–149)
TSH SERPL-ACNC: 1.09 UIU/ML (ref 0.35–5.5)
WBC # BLD AUTO: 6.2 K/UL (ref 4.8–10.8)

## 2024-10-21 PROCEDURE — 80061 LIPID PANEL: CPT

## 2024-10-21 PROCEDURE — 36415 COLL VENOUS BLD VENIPUNCTURE: CPT

## 2024-10-21 PROCEDURE — 85027 COMPLETE CBC AUTOMATED: CPT

## 2024-10-21 PROCEDURE — 84443 ASSAY THYROID STIM HORMONE: CPT

## 2024-10-21 PROCEDURE — 80053 COMPREHEN METABOLIC PANEL: CPT

## 2025-05-06 ENCOUNTER — TELEPHONE (OUTPATIENT)
Dept: CARDIOLOGY | Facility: MEDICAL CENTER | Age: 50
End: 2025-05-06
Payer: COMMERCIAL

## 2025-06-27 ENCOUNTER — APPOINTMENT (OUTPATIENT)
Dept: CARDIOLOGY | Facility: MEDICAL CENTER | Age: 50
End: 2025-06-27
Payer: COMMERCIAL

## 2025-08-26 ENCOUNTER — APPOINTMENT (OUTPATIENT)
Dept: CARDIOLOGY | Facility: MEDICAL CENTER | Age: 50
End: 2025-08-26
Payer: COMMERCIAL